# Patient Record
Sex: FEMALE | Race: OTHER | HISPANIC OR LATINO | URBAN - METROPOLITAN AREA
[De-identification: names, ages, dates, MRNs, and addresses within clinical notes are randomized per-mention and may not be internally consistent; named-entity substitution may affect disease eponyms.]

---

## 2021-02-12 ENCOUNTER — INPATIENT (INPATIENT)
Facility: HOSPITAL | Age: 31
LOS: 4 days | Discharge: HOME | End: 2021-02-17
Attending: OBSTETRICS & GYNECOLOGY | Admitting: OBSTETRICS & GYNECOLOGY

## 2021-02-12 VITALS
SYSTOLIC BLOOD PRESSURE: 143 MMHG | RESPIRATION RATE: 16 BRPM | DIASTOLIC BLOOD PRESSURE: 87 MMHG | TEMPERATURE: 99 F | HEART RATE: 45 BPM

## 2021-02-12 DIAGNOSIS — Z3A.00 WEEKS OF GESTATION OF PREGNANCY NOT SPECIFIED: ICD-10-CM

## 2021-02-12 DIAGNOSIS — O26.899 OTHER SPECIFIED PREGNANCY RELATED CONDITIONS, UNSPECIFIED TRIMESTER: ICD-10-CM

## 2021-02-12 DIAGNOSIS — Z98.890 OTHER SPECIFIED POSTPROCEDURAL STATES: Chronic | ICD-10-CM

## 2021-02-12 LAB
ALBUMIN SERPL ELPH-MCNC: 3.3 G/DL — SIGNIFICANT CHANGE UP (ref 3.3–5)
ALBUMIN SERPL ELPH-MCNC: 3.4 G/DL — SIGNIFICANT CHANGE UP (ref 3.3–5)
ALP SERPL-CCNC: 91 U/L — SIGNIFICANT CHANGE UP (ref 40–120)
ALP SERPL-CCNC: 92 U/L — SIGNIFICANT CHANGE UP (ref 40–120)
ALT FLD-CCNC: 11 U/L — SIGNIFICANT CHANGE UP (ref 4–33)
ALT FLD-CCNC: 13 U/L — SIGNIFICANT CHANGE UP (ref 4–33)
ANION GAP SERPL CALC-SCNC: 12 MMOL/L — SIGNIFICANT CHANGE UP (ref 7–14)
ANION GAP SERPL CALC-SCNC: 9 MMOL/L — SIGNIFICANT CHANGE UP (ref 7–14)
APPEARANCE UR: CLEAR — SIGNIFICANT CHANGE UP
APTT BLD: 26.8 SEC — LOW (ref 27–36.3)
APTT BLD: 31.7 SEC — SIGNIFICANT CHANGE UP (ref 27–36.3)
AST SERPL-CCNC: 18 U/L — SIGNIFICANT CHANGE UP (ref 4–32)
AST SERPL-CCNC: 19 U/L — SIGNIFICANT CHANGE UP (ref 4–32)
BACTERIA # UR AUTO: SIGNIFICANT CHANGE UP
BASOPHILS # BLD AUTO: 0.02 K/UL — SIGNIFICANT CHANGE UP (ref 0–0.2)
BASOPHILS # BLD AUTO: 0.02 K/UL — SIGNIFICANT CHANGE UP (ref 0–0.2)
BASOPHILS NFR BLD AUTO: 0.2 % — SIGNIFICANT CHANGE UP (ref 0–2)
BASOPHILS NFR BLD AUTO: 0.2 % — SIGNIFICANT CHANGE UP (ref 0–2)
BILIRUB SERPL-MCNC: 0.2 MG/DL — SIGNIFICANT CHANGE UP (ref 0.2–1.2)
BILIRUB SERPL-MCNC: <0.2 MG/DL — SIGNIFICANT CHANGE UP (ref 0.2–1.2)
BILIRUB UR-MCNC: NEGATIVE — SIGNIFICANT CHANGE UP
BLD GP AB SCN SERPL QL: NEGATIVE — SIGNIFICANT CHANGE UP
BUN SERPL-MCNC: 7 MG/DL — SIGNIFICANT CHANGE UP (ref 7–23)
BUN SERPL-MCNC: 8 MG/DL — SIGNIFICANT CHANGE UP (ref 7–23)
CALCIUM SERPL-MCNC: 8.4 MG/DL — SIGNIFICANT CHANGE UP (ref 8.4–10.5)
CALCIUM SERPL-MCNC: 8.9 MG/DL — SIGNIFICANT CHANGE UP (ref 8.4–10.5)
CHLORIDE SERPL-SCNC: 102 MMOL/L — SIGNIFICANT CHANGE UP (ref 98–107)
CHLORIDE SERPL-SCNC: 104 MMOL/L — SIGNIFICANT CHANGE UP (ref 98–107)
CO2 SERPL-SCNC: 23 MMOL/L — SIGNIFICANT CHANGE UP (ref 22–31)
CO2 SERPL-SCNC: 25 MMOL/L — SIGNIFICANT CHANGE UP (ref 22–31)
COLOR SPEC: YELLOW — SIGNIFICANT CHANGE UP
CREAT ?TM UR-MCNC: 95 MG/DL — SIGNIFICANT CHANGE UP
CREAT SERPL-MCNC: 0.54 MG/DL — SIGNIFICANT CHANGE UP (ref 0.5–1.3)
CREAT SERPL-MCNC: 0.57 MG/DL — SIGNIFICANT CHANGE UP (ref 0.5–1.3)
DIFF PNL FLD: NEGATIVE — SIGNIFICANT CHANGE UP
EOSINOPHIL # BLD AUTO: 0.1 K/UL — SIGNIFICANT CHANGE UP (ref 0–0.5)
EOSINOPHIL # BLD AUTO: 0.1 K/UL — SIGNIFICANT CHANGE UP (ref 0–0.5)
EOSINOPHIL NFR BLD AUTO: 1 % — SIGNIFICANT CHANGE UP (ref 0–6)
EOSINOPHIL NFR BLD AUTO: 1 % — SIGNIFICANT CHANGE UP (ref 0–6)
EPI CELLS # UR: 1 /HPF — SIGNIFICANT CHANGE UP (ref 0–5)
FIBRINOGEN PPP-MCNC: 516 MG/DL — SIGNIFICANT CHANGE UP (ref 290–520)
FIBRINOGEN PPP-MCNC: 614 MG/DL — HIGH (ref 290–520)
GLUCOSE SERPL-MCNC: 109 MG/DL — HIGH (ref 70–99)
GLUCOSE SERPL-MCNC: 76 MG/DL — SIGNIFICANT CHANGE UP (ref 70–99)
GLUCOSE UR QL: NEGATIVE — SIGNIFICANT CHANGE UP
HCT VFR BLD CALC: 34.2 % — LOW (ref 34.5–45)
HCT VFR BLD CALC: 34.3 % — LOW (ref 34.5–45)
HGB BLD-MCNC: 11.1 G/DL — LOW (ref 11.5–15.5)
HGB BLD-MCNC: 11.1 G/DL — LOW (ref 11.5–15.5)
HIV 1+2 AB+HIV1 P24 AG SERPL QL IA: SIGNIFICANT CHANGE UP
HYALINE CASTS # UR AUTO: 0 /LPF — SIGNIFICANT CHANGE UP (ref 0–7)
IANC: 7.23 K/UL — SIGNIFICANT CHANGE UP (ref 1.5–8.5)
IANC: 7.48 K/UL — SIGNIFICANT CHANGE UP (ref 1.5–8.5)
IMM GRANULOCYTES NFR BLD AUTO: 0.4 % — SIGNIFICANT CHANGE UP (ref 0–1.5)
IMM GRANULOCYTES NFR BLD AUTO: 0.7 % — SIGNIFICANT CHANGE UP (ref 0–1.5)
INR BLD: 0.98 RATIO — SIGNIFICANT CHANGE UP (ref 0.88–1.16)
INR BLD: 1.06 RATIO — SIGNIFICANT CHANGE UP (ref 0.88–1.16)
KETONES UR-MCNC: NEGATIVE — SIGNIFICANT CHANGE UP
LDH SERPL L TO P-CCNC: 185 U/L — SIGNIFICANT CHANGE UP (ref 135–225)
LDH SERPL L TO P-CCNC: 188 U/L — SIGNIFICANT CHANGE UP (ref 135–225)
LEUKOCYTE ESTERASE UR-ACNC: NEGATIVE — SIGNIFICANT CHANGE UP
LYMPHOCYTES # BLD AUTO: 1.41 K/UL — SIGNIFICANT CHANGE UP (ref 1–3.3)
LYMPHOCYTES # BLD AUTO: 1.83 K/UL — SIGNIFICANT CHANGE UP (ref 1–3.3)
LYMPHOCYTES # BLD AUTO: 14.6 % — SIGNIFICANT CHANGE UP (ref 13–44)
LYMPHOCYTES # BLD AUTO: 18.4 % — SIGNIFICANT CHANGE UP (ref 13–44)
MAGNESIUM SERPL-MCNC: 3.8 MG/DL — HIGH (ref 1.6–2.6)
MCHC RBC-ENTMCNC: 28.7 PG — SIGNIFICANT CHANGE UP (ref 27–34)
MCHC RBC-ENTMCNC: 29.1 PG — SIGNIFICANT CHANGE UP (ref 27–34)
MCHC RBC-ENTMCNC: 32.4 GM/DL — SIGNIFICANT CHANGE UP (ref 32–36)
MCHC RBC-ENTMCNC: 32.5 GM/DL — SIGNIFICANT CHANGE UP (ref 32–36)
MCV RBC AUTO: 88.6 FL — SIGNIFICANT CHANGE UP (ref 80–100)
MCV RBC AUTO: 89.5 FL — SIGNIFICANT CHANGE UP (ref 80–100)
MONOCYTES # BLD AUTO: 0.6 K/UL — SIGNIFICANT CHANGE UP (ref 0–0.9)
MONOCYTES # BLD AUTO: 0.73 K/UL — SIGNIFICANT CHANGE UP (ref 0–0.9)
MONOCYTES NFR BLD AUTO: 6.2 % — SIGNIFICANT CHANGE UP (ref 2–14)
MONOCYTES NFR BLD AUTO: 7.3 % — SIGNIFICANT CHANGE UP (ref 2–14)
NEUTROPHILS # BLD AUTO: 7.23 K/UL — SIGNIFICANT CHANGE UP (ref 1.8–7.4)
NEUTROPHILS # BLD AUTO: 7.48 K/UL — HIGH (ref 1.8–7.4)
NEUTROPHILS NFR BLD AUTO: 72.7 % — SIGNIFICANT CHANGE UP (ref 43–77)
NEUTROPHILS NFR BLD AUTO: 77.3 % — HIGH (ref 43–77)
NITRITE UR-MCNC: NEGATIVE — SIGNIFICANT CHANGE UP
NRBC # BLD: 0 /100 WBCS — SIGNIFICANT CHANGE UP
NRBC # BLD: 0 /100 WBCS — SIGNIFICANT CHANGE UP
NRBC # FLD: 0 K/UL — SIGNIFICANT CHANGE UP
NRBC # FLD: 0 K/UL — SIGNIFICANT CHANGE UP
PH UR: 7.5 — SIGNIFICANT CHANGE UP (ref 5–8)
PLATELET # BLD AUTO: 175 K/UL — SIGNIFICANT CHANGE UP (ref 150–400)
PLATELET # BLD AUTO: 176 K/UL — SIGNIFICANT CHANGE UP (ref 150–400)
POTASSIUM SERPL-MCNC: 3 MMOL/L — LOW (ref 3.5–5.3)
POTASSIUM SERPL-MCNC: 3.4 MMOL/L — LOW (ref 3.5–5.3)
POTASSIUM SERPL-SCNC: 3 MMOL/L — LOW (ref 3.5–5.3)
POTASSIUM SERPL-SCNC: 3.4 MMOL/L — LOW (ref 3.5–5.3)
PROT ?TM UR-MCNC: 16 MG/DL — SIGNIFICANT CHANGE UP
PROT ?TM UR-MCNC: 16 MG/DL — SIGNIFICANT CHANGE UP
PROT SERPL-MCNC: 6.2 G/DL — SIGNIFICANT CHANGE UP (ref 6–8.3)
PROT SERPL-MCNC: 6.3 G/DL — SIGNIFICANT CHANGE UP (ref 6–8.3)
PROT UR-MCNC: ABNORMAL
PROT/CREAT UR-RTO: 0.2 RATIO — SIGNIFICANT CHANGE UP (ref 0–0.2)
PROTHROM AB SERPL-ACNC: 11.3 SEC — SIGNIFICANT CHANGE UP (ref 10.6–13.6)
PROTHROM AB SERPL-ACNC: 12 SEC — SIGNIFICANT CHANGE UP (ref 10.6–13.6)
RBC # BLD: 3.82 M/UL — SIGNIFICANT CHANGE UP (ref 3.8–5.2)
RBC # BLD: 3.87 M/UL — SIGNIFICANT CHANGE UP (ref 3.8–5.2)
RBC # FLD: 13.6 % — SIGNIFICANT CHANGE UP (ref 10.3–14.5)
RBC # FLD: 13.8 % — SIGNIFICANT CHANGE UP (ref 10.3–14.5)
RBC CASTS # UR COMP ASSIST: 1 /HPF — SIGNIFICANT CHANGE UP (ref 0–4)
RH IG SCN BLD-IMP: POSITIVE — SIGNIFICANT CHANGE UP
RH IG SCN BLD-IMP: POSITIVE — SIGNIFICANT CHANGE UP
SARS-COV-2 RNA SPEC QL NAA+PROBE: SIGNIFICANT CHANGE UP
SODIUM SERPL-SCNC: 137 MMOL/L — SIGNIFICANT CHANGE UP (ref 135–145)
SODIUM SERPL-SCNC: 138 MMOL/L — SIGNIFICANT CHANGE UP (ref 135–145)
SP GR SPEC: 1.02 — SIGNIFICANT CHANGE UP (ref 1.01–1.02)
URATE SERPL-MCNC: 4.8 MG/DL — SIGNIFICANT CHANGE UP (ref 2.5–7)
URATE SERPL-MCNC: 5.1 MG/DL — SIGNIFICANT CHANGE UP (ref 2.5–7)
UROBILINOGEN FLD QL: SIGNIFICANT CHANGE UP
WBC # BLD: 9.68 K/UL — SIGNIFICANT CHANGE UP (ref 3.8–10.5)
WBC # BLD: 9.95 K/UL — SIGNIFICANT CHANGE UP (ref 3.8–10.5)
WBC # FLD AUTO: 9.68 K/UL — SIGNIFICANT CHANGE UP (ref 3.8–10.5)
WBC # FLD AUTO: 9.95 K/UL — SIGNIFICANT CHANGE UP (ref 3.8–10.5)
WBC UR QL: 2 /HPF — SIGNIFICANT CHANGE UP (ref 0–5)

## 2021-02-12 RX ORDER — MAGNESIUM SULFATE 500 MG/ML
4 VIAL (ML) INJECTION ONCE
Refills: 0 | Status: COMPLETED | OUTPATIENT
Start: 2021-02-12 | End: 2021-02-12

## 2021-02-12 RX ORDER — OXYTOCIN 10 UNIT/ML
333.33 VIAL (ML) INJECTION
Qty: 20 | Refills: 0 | Status: DISCONTINUED | OUTPATIENT
Start: 2021-02-12 | End: 2021-02-13

## 2021-02-12 RX ORDER — MAGNESIUM SULFATE 500 MG/ML
2 VIAL (ML) INJECTION
Qty: 40 | Refills: 0 | Status: DISCONTINUED | OUTPATIENT
Start: 2021-02-12 | End: 2021-02-12

## 2021-02-12 RX ORDER — SODIUM CHLORIDE 9 MG/ML
1000 INJECTION, SOLUTION INTRAVENOUS
Refills: 0 | Status: DISCONTINUED | OUTPATIENT
Start: 2021-02-12 | End: 2021-02-13

## 2021-02-12 RX ORDER — INFLUENZA VIRUS VACCINE 15; 15; 15; 15 UG/.5ML; UG/.5ML; UG/.5ML; UG/.5ML
0.5 SUSPENSION INTRAMUSCULAR ONCE
Refills: 0 | Status: COMPLETED | OUTPATIENT
Start: 2021-02-12 | End: 2021-02-12

## 2021-02-12 RX ORDER — MAGNESIUM SULFATE 500 MG/ML
2 VIAL (ML) INJECTION
Qty: 40 | Refills: 0 | Status: DISCONTINUED | OUTPATIENT
Start: 2021-02-12 | End: 2021-02-13

## 2021-02-12 RX ORDER — SODIUM CHLORIDE 9 MG/ML
1000 INJECTION, SOLUTION INTRAVENOUS
Refills: 0 | Status: DISCONTINUED | OUTPATIENT
Start: 2021-02-12 | End: 2021-02-12

## 2021-02-12 RX ORDER — OXYTOCIN 10 UNIT/ML
333.33 VIAL (ML) INJECTION
Qty: 20 | Refills: 0 | Status: DISCONTINUED | OUTPATIENT
Start: 2021-02-12 | End: 2021-02-12

## 2021-02-12 RX ADMIN — Medication 50 GM/HR: at 17:35

## 2021-02-12 RX ADMIN — Medication 200 GRAM(S): at 17:14

## 2021-02-12 NOTE — OB PROVIDER TRIAGE NOTE - NSHPPHYSICALEXAM_GEN_ALL_CORE
Vital Signs Last 24 Hrs  T(C): 37.1 (12 Feb 2021 15:05), Max: 37.1 (12 Feb 2021 14:52)  T(F): 98.78 (12 Feb 2021 15:05), Max: 98.8 (12 Feb 2021 14:52)  HR: 85 (12 Feb 2021 15:21) (45 - 90)  BP: 143/87 (12 Feb 2021 15:05) (143/87 - 143/87)  RR: 16 (12 Feb 2021 14:52) (16 - 16)  SpO2: 98% (12 Feb 2021 15:21) (98% - 99%)  TAS:  FHR:  CTX: Vital Signs Last 24 Hrs  T(C): 37.1 (12 Feb 2021 15:05), Max: 37.1 (12 Feb 2021 14:52)  T(F): 98.78 (12 Feb 2021 15:05), Max: 98.8 (12 Feb 2021 14:52)  HR: 85 (12 Feb 2021 15:21) (45 - 90)  BP: 143/87 (12 Feb 2021 15:05) (143/87 - 143/87)  RR: 16 (12 Feb 2021 14:52) (16 - 16)  SpO2: 98% (12 Feb 2021 15:21) (98% - 99%)  TAS:  FHR: 140 baseline with accelerations, moderate variability   CTX: no contractions Vital Signs Last 24 Hrs  T(C): 37.1 (12 Feb 2021 15:05), Max: 37.1 (12 Feb 2021 14:52)  T(F): 98.78 (12 Feb 2021 15:05), Max: 98.8 (12 Feb 2021 14:52)  HR: 85 (12 Feb 2021 15:21) (45 - 90)  BP: 143/87 (12 Feb 2021 15:05) (143/87 - 143/87)  RR: 16 (12 Feb 2021 14:52) (16 - 16)  SpO2: 98% (12 Feb 2021 15:21) (98% - 99%)  FHR: 135 baseline with accelerations, moderate variability   CTX: no contractions Vital Signs Last 24 Hrs  T(C): 37.1 (12 Feb 2021 15:05), Max: 37.1 (12 Feb 2021 14:52)  T(F): 98.78 (12 Feb 2021 15:05), Max: 98.8 (12 Feb 2021 14:52)  HR: 85 (12 Feb 2021 15:21) (45 - 90)  BP: 143/87 (12 Feb 2021 15:05) (143/87 - 143/87)  RR: 16 (12 Feb 2021 14:52) (16 - 16)  SpO2: 98% (12 Feb 2021 15:21) (98% - 99%)  FHR: 135 baseline with accelerations, moderate variability   CTX: no contractions  TAS: cephalic presentation  SVE: 0/40/-3  EFW 3062 from sono in office today

## 2021-02-12 NOTE — OB PROVIDER H&P - NSHPPHYSICALEXAM_GEN_ALL_CORE
Vital Signs Last 24 Hrs  T(C): 37.1 (12 Feb 2021 15:05), Max: 37.1 (12 Feb 2021 14:52)  T(F): 98.78 (12 Feb 2021 15:05), Max: 98.8 (12 Feb 2021 14:52)  HR: 85 (12 Feb 2021 15:21) (45 - 90)  BP: 143/87 (12 Feb 2021 15:05) (143/87 - 143/87)  RR: 16 (12 Feb 2021 14:52) (16 - 16)  SpO2: 98% (12 Feb 2021 15:21) (98% - 99%)  FHR: 135 baseline with accelerations, moderate variability   CTX: no contractions  TAS: cephalic presentation  SVE: 0/40/-3  EFW 3062 from sono in office today

## 2021-02-12 NOTE — OB PROVIDER TRIAGE NOTE - NSHPLABSRESULTS_GEN_ALL_CORE
11.1   9.68  )-----------( 175      ( 12 Feb 2021 14:30 )             34.3     02-12    138  |  104  |  8   ----------------------------<  109<H>  3.4<L>   |  25  |  0.57    Ca    8.9      12 Feb 2021 14:30    TPro  6.3  /  Alb  3.3  /  TBili  <0.2  /  DBili  x   /  AST  19  /  ALT  13  /  AlkPhos  91  02-12    PCR .2    Fibrinogen 614

## 2021-02-12 NOTE — OB PROVIDER TRIAGE NOTE - NSOBPROVIDERNOTE_OBGYN_ALL_OB_FT
Quick Look evaluation:    1400: /101 HR 88  1415: /100 HR 94  HELLP labs sent Quick Look evaluation:    1400: /101 HR 88  1415: /100 HR 94  HELLP labs sent    Admit to labor and delivery for induction of labor, for hypertensive crisis  - reviewed patient's history, assessment, blood pressure trend with   - for oral cytotec  - for Magnesium Sulfate  - COVID testing completed for patient and significant other  - admission consents obtained

## 2021-02-12 NOTE — OB PROVIDER H&P - ASSESSMENT
Admit to labor and delivery for induction of labor, for hypertensive crisis  - reviewed patient's history, assessment, blood pressure trend with   - for oral cytotec  - for Magnesium Sulfate  - COVID testing completed for patient and significant other  - admission consents obtained

## 2021-02-12 NOTE — OB PROVIDER TRIAGE NOTE - HISTORY OF PRESENT ILLNESS
's patient is a 31 y/o EDC 3/8/21 EGA 36   sent from office 146/98, 145/110, 146/98. Patient reports of a headache since yesterday, 3 out 10 on numeric pain scale. Denies visional changes. Patient reports of pain under the right breast, mostly at night.     COVID: positive   AP complications: Denies  Medical History: Denies  Surgical History: Hernia/Inguinal as a child  OBGYN History: Denies

## 2021-02-12 NOTE — OB PROVIDER H&P - PROBLEM SELECTOR PLAN 1
none
Admit to labor and delivery for induction of labor, for hypertensive crisis  - reviewed patient's history, assessment, blood pressure trend with   - for oral cytotec  - for Magnesium Sulfate  - COVID testing completed for patient and significant other  - admission consents obtained

## 2021-02-12 NOTE — OB PROVIDER H&P - HISTORY OF PRESENT ILLNESS
's patient is a 29 y/o EDC 3/8/21 EGA 36   sent from office 146/98, 145/110, 146/98. Patient reports of a headache since yesterday, 3 out 10 on numeric pain scale. Denies visional changes. Patient reports of pain under the right breast, mostly at night.     COVID: positive   AP complications: Denies  Medical History: Denies  Surgical History: Hernia/Inguinal as a child  OBGYN History: Denies

## 2021-02-12 NOTE — OB RN PATIENT PROFILE - NSRUBEOLARESULTS_OBGYN_ALL_OB
unknown Z Plasty Text: The lesion was extirpated to the level of the fat with a #15 scalpel blade.  Given the location of the defect, shape of the defect and the proximity to free margins a Z-plasty was deemed most appropriate for repair.  Using a sterile surgical marker, the appropriate transposition arms of the Z-plasty were drawn incorporating the defect and placing the expected incisions within the relaxed skin tension lines where possible.    The area thus outlined was incised deep to adipose tissue with a #15 scalpel blade.  The skin margins were undermined to an appropriate distance in all directions utilizing iris scissors.  The opposing transposition arms were then transposed into place in opposite direction and anchored with interrupted buried subcutaneous sutures.

## 2021-02-13 ENCOUNTER — TRANSCRIPTION ENCOUNTER (OUTPATIENT)
Age: 31
End: 2021-02-13

## 2021-02-13 LAB
ALBUMIN SERPL ELPH-MCNC: 3.4 G/DL — SIGNIFICANT CHANGE UP (ref 3.3–5)
ALP SERPL-CCNC: 97 U/L — SIGNIFICANT CHANGE UP (ref 40–120)
ALT FLD-CCNC: 14 U/L — SIGNIFICANT CHANGE UP (ref 4–33)
ANION GAP SERPL CALC-SCNC: 11 MMOL/L — SIGNIFICANT CHANGE UP (ref 7–14)
APTT BLD: 26.3 SEC — LOW (ref 27–36.3)
AST SERPL-CCNC: 17 U/L — SIGNIFICANT CHANGE UP (ref 4–32)
BASOPHILS # BLD AUTO: 0.02 K/UL — SIGNIFICANT CHANGE UP (ref 0–0.2)
BASOPHILS NFR BLD AUTO: 0.2 % — SIGNIFICANT CHANGE UP (ref 0–2)
BILIRUB SERPL-MCNC: 0.3 MG/DL — SIGNIFICANT CHANGE UP (ref 0.2–1.2)
BUN SERPL-MCNC: 4 MG/DL — LOW (ref 7–23)
CALCIUM SERPL-MCNC: 7.3 MG/DL — LOW (ref 8.4–10.5)
CHLORIDE SERPL-SCNC: 99 MMOL/L — SIGNIFICANT CHANGE UP (ref 98–107)
CO2 SERPL-SCNC: 26 MMOL/L — SIGNIFICANT CHANGE UP (ref 22–31)
CREAT SERPL-MCNC: 0.6 MG/DL — SIGNIFICANT CHANGE UP (ref 0.5–1.3)
EOSINOPHIL # BLD AUTO: 0.16 K/UL — SIGNIFICANT CHANGE UP (ref 0–0.5)
EOSINOPHIL NFR BLD AUTO: 1.6 % — SIGNIFICANT CHANGE UP (ref 0–6)
FIBRINOGEN PPP-MCNC: 649 MG/DL — HIGH (ref 290–520)
GLUCOSE SERPL-MCNC: 95 MG/DL — SIGNIFICANT CHANGE UP (ref 70–99)
HCT VFR BLD CALC: 34.6 % — SIGNIFICANT CHANGE UP (ref 34.5–45)
HGB BLD-MCNC: 11.1 G/DL — LOW (ref 11.5–15.5)
IANC: 7.81 K/UL — SIGNIFICANT CHANGE UP (ref 1.5–8.5)
IMM GRANULOCYTES NFR BLD AUTO: 0.4 % — SIGNIFICANT CHANGE UP (ref 0–1.5)
INR BLD: 1.04 RATIO — SIGNIFICANT CHANGE UP (ref 0.88–1.16)
LDH SERPL L TO P-CCNC: 191 U/L — SIGNIFICANT CHANGE UP (ref 135–225)
LYMPHOCYTES # BLD AUTO: 1.4 K/UL — SIGNIFICANT CHANGE UP (ref 1–3.3)
LYMPHOCYTES # BLD AUTO: 13.8 % — SIGNIFICANT CHANGE UP (ref 13–44)
MAGNESIUM SERPL-MCNC: 4.5 MG/DL — HIGH (ref 1.6–2.6)
MAGNESIUM SERPL-MCNC: 4.8 MG/DL — HIGH (ref 1.6–2.6)
MAGNESIUM SERPL-MCNC: 4.9 MG/DL — HIGH (ref 1.6–2.6)
MAGNESIUM SERPL-MCNC: 4.9 MG/DL — HIGH (ref 1.6–2.6)
MCHC RBC-ENTMCNC: 28.9 PG — SIGNIFICANT CHANGE UP (ref 27–34)
MCHC RBC-ENTMCNC: 32.1 GM/DL — SIGNIFICANT CHANGE UP (ref 32–36)
MCV RBC AUTO: 90.1 FL — SIGNIFICANT CHANGE UP (ref 80–100)
MONOCYTES # BLD AUTO: 0.68 K/UL — SIGNIFICANT CHANGE UP (ref 0–0.9)
MONOCYTES NFR BLD AUTO: 6.7 % — SIGNIFICANT CHANGE UP (ref 2–14)
NEUTROPHILS # BLD AUTO: 7.81 K/UL — HIGH (ref 1.8–7.4)
NEUTROPHILS NFR BLD AUTO: 77.3 % — HIGH (ref 43–77)
NRBC # BLD: 0 /100 WBCS — SIGNIFICANT CHANGE UP
NRBC # FLD: 0 K/UL — SIGNIFICANT CHANGE UP
PLATELET # BLD AUTO: 166 K/UL — SIGNIFICANT CHANGE UP (ref 150–400)
POTASSIUM SERPL-MCNC: 3 MMOL/L — LOW (ref 3.5–5.3)
POTASSIUM SERPL-SCNC: 3 MMOL/L — LOW (ref 3.5–5.3)
PROT SERPL-MCNC: 6.2 G/DL — SIGNIFICANT CHANGE UP (ref 6–8.3)
PROTHROM AB SERPL-ACNC: 11.8 SEC — SIGNIFICANT CHANGE UP (ref 10.6–13.6)
RBC # BLD: 3.84 M/UL — SIGNIFICANT CHANGE UP (ref 3.8–5.2)
RBC # FLD: 13.7 % — SIGNIFICANT CHANGE UP (ref 10.3–14.5)
SARS-COV-2 IGG SERPL QL IA: POSITIVE
SARS-COV-2 IGM SERPL IA-ACNC: 6 INDEX — HIGH
SODIUM SERPL-SCNC: 136 MMOL/L — SIGNIFICANT CHANGE UP (ref 135–145)
T PALLIDUM AB TITR SER: NEGATIVE — SIGNIFICANT CHANGE UP
URATE SERPL-MCNC: 5.7 MG/DL — SIGNIFICANT CHANGE UP (ref 2.5–7)
WBC # BLD: 10.11 K/UL — SIGNIFICANT CHANGE UP (ref 3.8–10.5)
WBC # FLD AUTO: 10.11 K/UL — SIGNIFICANT CHANGE UP (ref 3.8–10.5)

## 2021-02-13 RX ORDER — SODIUM CHLORIDE 9 MG/ML
1000 INJECTION, SOLUTION INTRAVENOUS
Refills: 0 | Status: DISCONTINUED | OUTPATIENT
Start: 2021-02-13 | End: 2021-02-13

## 2021-02-13 RX ORDER — OXYTOCIN 10 UNIT/ML
333.33 VIAL (ML) INJECTION
Qty: 20 | Refills: 0 | Status: DISCONTINUED | OUTPATIENT
Start: 2021-02-13 | End: 2021-02-16

## 2021-02-13 RX ORDER — SODIUM CHLORIDE 9 MG/ML
1000 INJECTION, SOLUTION INTRAVENOUS
Refills: 0 | Status: DISCONTINUED | OUTPATIENT
Start: 2021-02-13 | End: 2021-02-14

## 2021-02-13 RX ORDER — OXYTOCIN 10 UNIT/ML
2 VIAL (ML) INJECTION
Qty: 30 | Refills: 0 | Status: DISCONTINUED | OUTPATIENT
Start: 2021-02-13 | End: 2021-02-14

## 2021-02-13 RX ORDER — MAGNESIUM SULFATE 500 MG/ML
2 VIAL (ML) INJECTION
Qty: 40 | Refills: 0 | Status: COMPLETED | OUTPATIENT
Start: 2021-02-13 | End: 2021-02-14

## 2021-02-13 RX ORDER — DIPHENHYDRAMINE HCL 50 MG
50 CAPSULE ORAL EVERY 6 HOURS
Refills: 0 | Status: DISCONTINUED | OUTPATIENT
Start: 2021-02-13 | End: 2021-02-14

## 2021-02-13 RX ADMIN — Medication 50 MILLIGRAM(S): at 05:31

## 2021-02-13 RX ADMIN — Medication 50 GM/HR: at 07:11

## 2021-02-13 RX ADMIN — Medication 2 MILLIUNIT(S)/MIN: at 21:37

## 2021-02-13 NOTE — CHART NOTE - NSCHARTNOTEFT_GEN_A_CORE
R3  Patient re-examined by Dr. Garcia PGY1 for cervical change and removal of CB.  CB deflated, VE 4/60/-3.  EFM: 120, mod, +accels, -decels  Cesar Chavez: irregular, q2-4min  Will proceed with froilna Hernadez PGY3  d/w Dr. Vazquez

## 2021-02-14 LAB
ALBUMIN SERPL ELPH-MCNC: 3.4 G/DL — SIGNIFICANT CHANGE UP (ref 3.3–5)
ALP SERPL-CCNC: 102 U/L — SIGNIFICANT CHANGE UP (ref 40–120)
ALT FLD-CCNC: 11 U/L — SIGNIFICANT CHANGE UP (ref 4–33)
ANION GAP SERPL CALC-SCNC: 10 MMOL/L — SIGNIFICANT CHANGE UP (ref 7–14)
APTT BLD: 26.5 SEC — LOW (ref 27–36.3)
AST SERPL-CCNC: 14 U/L — SIGNIFICANT CHANGE UP (ref 4–32)
BASOPHILS # BLD AUTO: 0.02 K/UL — SIGNIFICANT CHANGE UP (ref 0–0.2)
BASOPHILS NFR BLD AUTO: 0.2 % — SIGNIFICANT CHANGE UP (ref 0–2)
BILIRUB SERPL-MCNC: 0.4 MG/DL — SIGNIFICANT CHANGE UP (ref 0.2–1.2)
BUN SERPL-MCNC: 3 MG/DL — LOW (ref 7–23)
CALCIUM SERPL-MCNC: 7.4 MG/DL — LOW (ref 8.4–10.5)
CHLORIDE SERPL-SCNC: 101 MMOL/L — SIGNIFICANT CHANGE UP (ref 98–107)
CO2 SERPL-SCNC: 25 MMOL/L — SIGNIFICANT CHANGE UP (ref 22–31)
CREAT SERPL-MCNC: 0.57 MG/DL — SIGNIFICANT CHANGE UP (ref 0.5–1.3)
EOSINOPHIL # BLD AUTO: 0.16 K/UL — SIGNIFICANT CHANGE UP (ref 0–0.5)
EOSINOPHIL NFR BLD AUTO: 1.4 % — SIGNIFICANT CHANGE UP (ref 0–6)
FIBRINOGEN PPP-MCNC: 690 MG/DL — HIGH (ref 290–520)
GLUCOSE SERPL-MCNC: 87 MG/DL — SIGNIFICANT CHANGE UP (ref 70–99)
HCT VFR BLD CALC: 34.5 % — SIGNIFICANT CHANGE UP (ref 34.5–45)
HGB BLD-MCNC: 11.4 G/DL — LOW (ref 11.5–15.5)
IANC: 9.18 K/UL — HIGH (ref 1.5–8.5)
IMM GRANULOCYTES NFR BLD AUTO: 0.4 % — SIGNIFICANT CHANGE UP (ref 0–1.5)
INR BLD: 1.02 RATIO — SIGNIFICANT CHANGE UP (ref 0.88–1.16)
LDH SERPL L TO P-CCNC: 199 U/L — SIGNIFICANT CHANGE UP (ref 135–225)
LYMPHOCYTES # BLD AUTO: 1.12 K/UL — SIGNIFICANT CHANGE UP (ref 1–3.3)
LYMPHOCYTES # BLD AUTO: 9.8 % — LOW (ref 13–44)
MAGNESIUM SERPL-MCNC: 4.1 MG/DL — HIGH (ref 1.6–2.6)
MAGNESIUM SERPL-MCNC: 5.1 MG/DL — HIGH (ref 1.6–2.6)
MAGNESIUM SERPL-MCNC: 5.6 MG/DL — HIGH (ref 1.6–2.6)
MCHC RBC-ENTMCNC: 28.8 PG — SIGNIFICANT CHANGE UP (ref 27–34)
MCHC RBC-ENTMCNC: 33 GM/DL — SIGNIFICANT CHANGE UP (ref 32–36)
MCV RBC AUTO: 87.1 FL — SIGNIFICANT CHANGE UP (ref 80–100)
MONOCYTES # BLD AUTO: 0.93 K/UL — HIGH (ref 0–0.9)
MONOCYTES NFR BLD AUTO: 8.1 % — SIGNIFICANT CHANGE UP (ref 2–14)
NEUTROPHILS # BLD AUTO: 9.18 K/UL — HIGH (ref 1.8–7.4)
NEUTROPHILS NFR BLD AUTO: 80.1 % — HIGH (ref 43–77)
NRBC # BLD: 0 /100 WBCS — SIGNIFICANT CHANGE UP
NRBC # FLD: 0 K/UL — SIGNIFICANT CHANGE UP
PLATELET # BLD AUTO: 179 K/UL — SIGNIFICANT CHANGE UP (ref 150–400)
POTASSIUM SERPL-MCNC: 3.1 MMOL/L — LOW (ref 3.5–5.3)
POTASSIUM SERPL-SCNC: 3.1 MMOL/L — LOW (ref 3.5–5.3)
PROT SERPL-MCNC: 6.4 G/DL — SIGNIFICANT CHANGE UP (ref 6–8.3)
PROTHROM AB SERPL-ACNC: 11.7 SEC — SIGNIFICANT CHANGE UP (ref 10.6–13.6)
RBC # BLD: 3.96 M/UL — SIGNIFICANT CHANGE UP (ref 3.8–5.2)
RBC # FLD: 13.8 % — SIGNIFICANT CHANGE UP (ref 10.3–14.5)
SODIUM SERPL-SCNC: 136 MMOL/L — SIGNIFICANT CHANGE UP (ref 135–145)
URATE SERPL-MCNC: 6.1 MG/DL — SIGNIFICANT CHANGE UP (ref 2.5–7)
WBC # BLD: 11.46 K/UL — HIGH (ref 3.8–10.5)
WBC # FLD AUTO: 11.46 K/UL — HIGH (ref 3.8–10.5)

## 2021-02-14 RX ORDER — MAGNESIUM HYDROXIDE 400 MG/1
30 TABLET, CHEWABLE ORAL
Refills: 0 | Status: DISCONTINUED | OUTPATIENT
Start: 2021-02-14 | End: 2021-02-17

## 2021-02-14 RX ORDER — TETANUS TOXOID, REDUCED DIPHTHERIA TOXOID AND ACELLULAR PERTUSSIS VACCINE, ADSORBED 5; 2.5; 8; 8; 2.5 [IU]/.5ML; [IU]/.5ML; UG/.5ML; UG/.5ML; UG/.5ML
0.5 SUSPENSION INTRAMUSCULAR ONCE
Refills: 0 | Status: DISCONTINUED | OUTPATIENT
Start: 2021-02-14 | End: 2021-02-17

## 2021-02-14 RX ORDER — SODIUM CHLORIDE 9 MG/ML
1000 INJECTION INTRAMUSCULAR; INTRAVENOUS; SUBCUTANEOUS
Refills: 0 | Status: DISCONTINUED | OUTPATIENT
Start: 2021-02-14 | End: 2021-02-14

## 2021-02-14 RX ORDER — OXYCODONE HYDROCHLORIDE 5 MG/1
5 TABLET ORAL ONCE
Refills: 0 | Status: DISCONTINUED | OUTPATIENT
Start: 2021-02-14 | End: 2021-02-17

## 2021-02-14 RX ORDER — NALBUPHINE HYDROCHLORIDE 10 MG/ML
2.5 INJECTION, SOLUTION INTRAMUSCULAR; INTRAVENOUS; SUBCUTANEOUS EVERY 6 HOURS
Refills: 0 | Status: DISCONTINUED | OUTPATIENT
Start: 2021-02-14 | End: 2021-02-17

## 2021-02-14 RX ORDER — LANOLIN
1 OINTMENT (GRAM) TOPICAL EVERY 6 HOURS
Refills: 0 | Status: DISCONTINUED | OUTPATIENT
Start: 2021-02-14 | End: 2021-02-17

## 2021-02-14 RX ORDER — OXYCODONE HYDROCHLORIDE 5 MG/1
5 TABLET ORAL
Refills: 0 | Status: DISCONTINUED | OUTPATIENT
Start: 2021-02-14 | End: 2021-02-14

## 2021-02-14 RX ORDER — SIMETHICONE 80 MG/1
80 TABLET, CHEWABLE ORAL EVERY 4 HOURS
Refills: 0 | Status: DISCONTINUED | OUTPATIENT
Start: 2021-02-14 | End: 2021-02-17

## 2021-02-14 RX ORDER — ACETAMINOPHEN 500 MG
1000 TABLET ORAL EVERY 6 HOURS
Refills: 0 | Status: COMPLETED | OUTPATIENT
Start: 2021-02-14 | End: 2021-02-15

## 2021-02-14 RX ORDER — MAGNESIUM SULFATE 500 MG/ML
2 VIAL (ML) INJECTION
Qty: 40 | Refills: 0 | Status: DISCONTINUED | OUTPATIENT
Start: 2021-02-14 | End: 2021-02-15

## 2021-02-14 RX ORDER — ONDANSETRON 8 MG/1
4 TABLET, FILM COATED ORAL EVERY 6 HOURS
Refills: 0 | Status: DISCONTINUED | OUTPATIENT
Start: 2021-02-14 | End: 2021-02-17

## 2021-02-14 RX ORDER — NALOXONE HYDROCHLORIDE 4 MG/.1ML
0.1 SPRAY NASAL
Refills: 0 | Status: DISCONTINUED | OUTPATIENT
Start: 2021-02-14 | End: 2021-02-17

## 2021-02-14 RX ORDER — MORPHINE SULFATE 50 MG/1
0.1 CAPSULE, EXTENDED RELEASE ORAL ONCE
Refills: 0 | Status: DISCONTINUED | OUTPATIENT
Start: 2021-02-14 | End: 2021-02-17

## 2021-02-14 RX ORDER — METOCLOPRAMIDE HCL 10 MG
10 TABLET ORAL ONCE
Refills: 0 | Status: DISCONTINUED | OUTPATIENT
Start: 2021-02-14 | End: 2021-02-17

## 2021-02-14 RX ORDER — DIPHENHYDRAMINE HCL 50 MG
25 CAPSULE ORAL EVERY 6 HOURS
Refills: 0 | Status: DISCONTINUED | OUTPATIENT
Start: 2021-02-14 | End: 2021-02-17

## 2021-02-14 RX ORDER — FAMOTIDINE 10 MG/ML
20 INJECTION INTRAVENOUS ONCE
Refills: 0 | Status: COMPLETED | OUTPATIENT
Start: 2021-02-14 | End: 2021-02-14

## 2021-02-14 RX ORDER — OXYCODONE HYDROCHLORIDE 5 MG/1
5 TABLET ORAL
Refills: 0 | Status: COMPLETED | OUTPATIENT
Start: 2021-02-14 | End: 2021-02-21

## 2021-02-14 RX ORDER — HEPARIN SODIUM 5000 [USP'U]/ML
5000 INJECTION INTRAVENOUS; SUBCUTANEOUS EVERY 12 HOURS
Refills: 0 | Status: DISCONTINUED | OUTPATIENT
Start: 2021-02-14 | End: 2021-02-17

## 2021-02-14 RX ORDER — CITRIC ACID/SODIUM CITRATE 300-500 MG
30 SOLUTION, ORAL ORAL ONCE
Refills: 0 | Status: COMPLETED | OUTPATIENT
Start: 2021-02-14 | End: 2021-02-14

## 2021-02-14 RX ORDER — SODIUM CHLORIDE 9 MG/ML
1000 INJECTION, SOLUTION INTRAVENOUS
Refills: 0 | Status: DISCONTINUED | OUTPATIENT
Start: 2021-02-14 | End: 2021-02-16

## 2021-02-14 RX ORDER — SODIUM CHLORIDE 9 MG/ML
300 INJECTION INTRAMUSCULAR; INTRAVENOUS; SUBCUTANEOUS ONCE
Refills: 0 | Status: DISCONTINUED | OUTPATIENT
Start: 2021-02-14 | End: 2021-02-14

## 2021-02-14 RX ORDER — DEXAMETHASONE 0.5 MG/5ML
4 ELIXIR ORAL EVERY 6 HOURS
Refills: 0 | Status: DISCONTINUED | OUTPATIENT
Start: 2021-02-14 | End: 2021-02-17

## 2021-02-14 RX ORDER — OXYTOCIN 10 UNIT/ML
333.33 VIAL (ML) INJECTION
Qty: 20 | Refills: 0 | Status: DISCONTINUED | OUTPATIENT
Start: 2021-02-14 | End: 2021-02-16

## 2021-02-14 RX ORDER — ACETAMINOPHEN 500 MG
975 TABLET ORAL EVERY 6 HOURS
Refills: 0 | Status: COMPLETED | OUTPATIENT
Start: 2021-02-14 | End: 2022-01-13

## 2021-02-14 RX ORDER — METOCLOPRAMIDE HCL 10 MG
10 TABLET ORAL ONCE
Refills: 0 | Status: COMPLETED | OUTPATIENT
Start: 2021-02-14 | End: 2021-02-14

## 2021-02-14 RX ORDER — OXYCODONE HYDROCHLORIDE 5 MG/1
10 TABLET ORAL
Refills: 0 | Status: DISCONTINUED | OUTPATIENT
Start: 2021-02-14 | End: 2021-02-14

## 2021-02-14 RX ADMIN — ONDANSETRON 4 MILLIGRAM(S): 8 TABLET, FILM COATED ORAL at 16:34

## 2021-02-14 RX ADMIN — Medication 50 GM/HR: at 07:10

## 2021-02-14 RX ADMIN — Medication 1000 MILLIUNIT(S)/MIN: at 15:32

## 2021-02-14 RX ADMIN — Medication 400 MILLIGRAM(S): at 23:00

## 2021-02-14 RX ADMIN — Medication 50 GM/HR: at 19:22

## 2021-02-14 RX ADMIN — Medication 30 MILLILITER(S): at 14:00

## 2021-02-14 RX ADMIN — Medication 10 MILLIGRAM(S): at 14:00

## 2021-02-14 RX ADMIN — HEPARIN SODIUM 5000 UNIT(S): 5000 INJECTION INTRAVENOUS; SUBCUTANEOUS at 23:00

## 2021-02-14 RX ADMIN — Medication 400 MILLIGRAM(S): at 17:49

## 2021-02-14 RX ADMIN — Medication 50 GM/HR: at 18:31

## 2021-02-14 RX ADMIN — FAMOTIDINE 20 MILLIGRAM(S): 10 INJECTION INTRAVENOUS at 14:00

## 2021-02-14 NOTE — OB PROVIDER LABOR PROGRESS NOTE - NS_OBIHIFHRDETAILS_OBGYN_ALL_OB_FT
120s, mod hubert, +accels, -decels
EFM: 120/mod. variability/+accles/-decels
baseline 120 mod hubert +accel no decel
Cat 1
Cat 1

## 2021-02-14 NOTE — CHART NOTE - NSCHARTNOTEFT_GEN_A_CORE
R1 Chart note    Pt seen at bedside due to severe BP. Pt states that her arm was bent during the recording. Denies fever, headache, CP, SOB, abdominal pain and edema    Vital Signs Last 24 Hrs  T(C): 36.8 (2021 03:02), Max: 37.0 (2021 05:11)  T(F): 98.24 (2021 03:02), Max: 98.6 (2021 05:11)  HR: 100 (2021 04:42) (65 - 116)  BP: 156/95 (2021 04:41) (109/66 - 162/102)  BP(mean): --  RR: --  SpO2: 99% (2021 04:42) (83% - 100%)    Gen NAD  Abd soft nontender  Ext trace edema    A/P  IOL sPEC on Mg  - continuous monitoring  - continue to monitor BPs and HELLP  - routine labor care   - anticipate     d/w Dr. Marisol Mera PGY1

## 2021-02-14 NOTE — OB RN DELIVERY SUMMARY - NS_SEPSISRSKCALC_OBGYN_ALL_OB_FT
EOS calculated successfully. EOS Risk Factor: 0.5/1000 live births ([facility] national incidence); GA=36w6d; Temp=98.8; ROM=7.317; GBS='Negative'; Antibiotics='No antibiotics or any antibiotics < 2 hrs prior to birth'

## 2021-02-14 NOTE — OB NEONATOLOGY/PEDIATRICIAN DELIVERY SUMMARY - NSPEDSNEONOTESA_OBGYN_ALL_OB_FT
Baby boy born at 36.6 wks via CS to a 29 y/o  O+ blood type mother. Prenatal history of IOL for SPEC on magnesium, arrest of descent, cat II tracing. History of COVID in 2020. PNL nr/immune/-, GBS - on . No rupture, no labor, Baby emerged vigorous, crying, was w/d/s/s with APGARS of . Mom would like to breast/bottle feed, requests/declines Hep B and consents/declines circ. EOS Baby boy born at 36.6 wks via CS to a 27 y/o  O+ blood type mother. Prenatal history of IOL for SPEC on magnesium, arrest of descent, cat II tracing. History of COVID in 2020. PNL nr/immune/-, GBS - on . No rupture, no labor, Baby emerged poor tone, spontaneous cry, was w/d/s/s with APGARS of 6/8. Received CPAP  until 8MOL for shallow breathing, to maintain appropriate oxygen saturation for age. Weaned to room air with O2 sat in mid to high 90s by 10MOL, tone improved by 10 MOL. Mom would like to breast feed, requests Hep B and consents circ. EOS 0.13. Maternal temp 36.6. Covid neg.

## 2021-02-14 NOTE — CHART NOTE - NSCHARTNOTEFT_GEN_A_CORE
Pt seen at bedside to discuss plan of care.    Pt examined by Dr Dao and found to be same exam since 7AM despite augmentation with pitocin and AROM.  Tracing now cat II with late decels.  Discussed with patient, risks, benefits, alternatives of  discussed. Pt consents to .    TLal PGY4

## 2021-02-14 NOTE — OB PROVIDER DELIVERY SUMMARY - NSPROVIDERDELIVERYNOTE_OBGYN_ALL_OB_FT
Liveborn infant Male Apgars 6/8, Wt 6#1  QBL: 712  EBL: 1100  IVF 2700  UOP: 200  Uncomplicated pLTCS for Cat II tracing and Arrest of dilation

## 2021-02-14 NOTE — OB PROVIDER LABOR PROGRESS NOTE - ASSESSMENT
- pt AROM and IUPC placed    TLal PGY4  d/w Dr Barrera
31yo  sPEC/Mg s/p PO and CB  - continue pitocin  - continuous monitoring  - routine labor care    d/w Dr. Marisol Mera PGY1
IOL sPEC on Mg  - continue pitocin  - continuous monitoring  - routine labor care    d/w Dr. Marisol Mera PGY1
Plan   balloon in place  cont PO cytotec  cont EFM/Elida   Anticipate     Kaila Grimm MD PGY1  Plan previously discussed with Dr. Sosa, Dr. Green
Plan: 29y/o  @36w4d in stable condition  - Con't IOL w/ po cytotec  - Continuous EFM, Lindsay  - Con't IVF    D/W attending physician Dr. Cathy Cruz MD  PGY-1
- epidural top off given  -c/w pitocin  d/w Dr. Bruce Mccullough PGY1

## 2021-02-14 NOTE — OB PROVIDER LABOR PROGRESS NOTE - NS_SUBJECTIVE/OBJECTIVE_OBGYN_ALL_OB_FT
Evaluated for cervical change. Feeling increased rectal pressure.
R1 Progress Note     Patient examined to check if balloon was still in place. Balloon in place, exam unchanged
PGY1 Labor & Delivery Progress Note     Pt seen & examined at bedside. Cervical balloon placed without incidence.    T(C): 36.8 (02-12-21 @ 21:14), Max: 37.1 (02-12-21 @ 14:52)  HR: 88 (02-12-21 @ 21:35) (45 - 111)  BP: 135/84 (02-12-21 @ 21:28) (124/63 - 158/87)  RR: 18 (02-12-21 @ 18:03) (16 - 18)  SpO2: 98% (02-12-21 @ 21:35) (97% - 100%)
Pt examined at bedside for pressure
Pt examined at bedside for pressure
pt seen at bedside due to inc pressure

## 2021-02-14 NOTE — CHART NOTE - NSCHARTNOTEFT_GEN_A_CORE
ATT Note:  Pt evaluated and examined by me. Cervix 4.5 cm dilated at best/70%/-3 station, FHR with late decelerations though some accels. Discussed these findings with pt who has been on pitocin, now 12 mu/min. She understands arrest of dilation since last vonda and agrees to proceed with CS. Anesthesiologist and all appropriate staff notified. Informed consent obtained.

## 2021-02-15 ENCOUNTER — TRANSCRIPTION ENCOUNTER (OUTPATIENT)
Age: 31
End: 2021-02-15

## 2021-02-15 LAB
ALBUMIN SERPL ELPH-MCNC: 2.7 G/DL — LOW (ref 3.3–5)
ALP SERPL-CCNC: 83 U/L — SIGNIFICANT CHANGE UP (ref 40–120)
ALT FLD-CCNC: 7 U/L — SIGNIFICANT CHANGE UP (ref 4–33)
ANION GAP SERPL CALC-SCNC: 10 MMOL/L — SIGNIFICANT CHANGE UP (ref 7–14)
APTT BLD: 27.5 SEC — SIGNIFICANT CHANGE UP (ref 27–36.3)
AST SERPL-CCNC: 14 U/L — SIGNIFICANT CHANGE UP (ref 4–32)
BASOPHILS # BLD AUTO: 0.01 K/UL — SIGNIFICANT CHANGE UP (ref 0–0.2)
BASOPHILS NFR BLD AUTO: 0.1 % — SIGNIFICANT CHANGE UP (ref 0–2)
BILIRUB SERPL-MCNC: <0.2 MG/DL — SIGNIFICANT CHANGE UP (ref 0.2–1.2)
BUN SERPL-MCNC: 3 MG/DL — LOW (ref 7–23)
CALCIUM SERPL-MCNC: 7.9 MG/DL — LOW (ref 8.4–10.5)
CHLORIDE SERPL-SCNC: 98 MMOL/L — SIGNIFICANT CHANGE UP (ref 98–107)
CO2 SERPL-SCNC: 26 MMOL/L — SIGNIFICANT CHANGE UP (ref 22–31)
CREAT SERPL-MCNC: 0.58 MG/DL — SIGNIFICANT CHANGE UP (ref 0.5–1.3)
EOSINOPHIL # BLD AUTO: 0.01 K/UL — SIGNIFICANT CHANGE UP (ref 0–0.5)
EOSINOPHIL NFR BLD AUTO: 0.1 % — SIGNIFICANT CHANGE UP (ref 0–6)
FIBRINOGEN PPP-MCNC: 681 MG/DL — HIGH (ref 290–520)
GLUCOSE SERPL-MCNC: 98 MG/DL — SIGNIFICANT CHANGE UP (ref 70–99)
HCT VFR BLD CALC: 27.2 % — LOW (ref 34.5–45)
HGB BLD-MCNC: 9 G/DL — LOW (ref 11.5–15.5)
IANC: 12.59 K/UL — HIGH (ref 1.5–8.5)
IMM GRANULOCYTES NFR BLD AUTO: 0.7 % — SIGNIFICANT CHANGE UP (ref 0–1.5)
INR BLD: 1.06 RATIO — SIGNIFICANT CHANGE UP (ref 0.88–1.16)
LDH SERPL L TO P-CCNC: 253 U/L — HIGH (ref 135–225)
LYMPHOCYTES # BLD AUTO: 1.41 K/UL — SIGNIFICANT CHANGE UP (ref 1–3.3)
LYMPHOCYTES # BLD AUTO: 9.3 % — LOW (ref 13–44)
MAGNESIUM SERPL-MCNC: 4.7 MG/DL — HIGH (ref 1.6–2.6)
MAGNESIUM SERPL-MCNC: 4.9 MG/DL — HIGH (ref 1.6–2.6)
MCHC RBC-ENTMCNC: 29 PG — SIGNIFICANT CHANGE UP (ref 27–34)
MCHC RBC-ENTMCNC: 33.1 GM/DL — SIGNIFICANT CHANGE UP (ref 32–36)
MCV RBC AUTO: 87.7 FL — SIGNIFICANT CHANGE UP (ref 80–100)
MONOCYTES # BLD AUTO: 1.04 K/UL — HIGH (ref 0–0.9)
MONOCYTES NFR BLD AUTO: 6.9 % — SIGNIFICANT CHANGE UP (ref 2–14)
NEUTROPHILS # BLD AUTO: 12.59 K/UL — HIGH (ref 1.8–7.4)
NEUTROPHILS NFR BLD AUTO: 82.9 % — HIGH (ref 43–77)
NRBC # BLD: 0 /100 WBCS — SIGNIFICANT CHANGE UP
NRBC # FLD: 0 K/UL — SIGNIFICANT CHANGE UP
PLATELET # BLD AUTO: 176 K/UL — SIGNIFICANT CHANGE UP (ref 150–400)
POTASSIUM SERPL-MCNC: 3.5 MMOL/L — SIGNIFICANT CHANGE UP (ref 3.5–5.3)
POTASSIUM SERPL-SCNC: 3.5 MMOL/L — SIGNIFICANT CHANGE UP (ref 3.5–5.3)
PROT SERPL-MCNC: 5.5 G/DL — LOW (ref 6–8.3)
PROTHROM AB SERPL-ACNC: 12.1 SEC — SIGNIFICANT CHANGE UP (ref 10.6–13.6)
RBC # BLD: 3.1 M/UL — LOW (ref 3.8–5.2)
RBC # FLD: 13.7 % — SIGNIFICANT CHANGE UP (ref 10.3–14.5)
SODIUM SERPL-SCNC: 134 MMOL/L — LOW (ref 135–145)
URATE SERPL-MCNC: 5.6 MG/DL — SIGNIFICANT CHANGE UP (ref 2.5–7)
WBC # BLD: 15.16 K/UL — HIGH (ref 3.8–10.5)
WBC # FLD AUTO: 15.16 K/UL — HIGH (ref 3.8–10.5)

## 2021-02-15 RX ORDER — ACETAMINOPHEN 500 MG
3 TABLET ORAL
Qty: 0 | Refills: 0 | DISCHARGE
Start: 2021-02-15

## 2021-02-15 RX ORDER — OXYCODONE HYDROCHLORIDE 5 MG/1
5 TABLET ORAL
Refills: 0 | Status: DISCONTINUED | OUTPATIENT
Start: 2021-02-15 | End: 2021-02-17

## 2021-02-15 RX ORDER — ACETAMINOPHEN 500 MG
975 TABLET ORAL EVERY 6 HOURS
Refills: 0 | Status: DISCONTINUED | OUTPATIENT
Start: 2021-02-15 | End: 2021-02-17

## 2021-02-15 RX ORDER — IBUPROFEN 200 MG
600 TABLET ORAL EVERY 6 HOURS
Refills: 0 | Status: DISCONTINUED | OUTPATIENT
Start: 2021-02-15 | End: 2021-02-17

## 2021-02-15 RX ORDER — KETOROLAC TROMETHAMINE 30 MG/ML
30 SYRINGE (ML) INJECTION EVERY 6 HOURS
Refills: 0 | Status: DISCONTINUED | OUTPATIENT
Start: 2021-02-15 | End: 2021-02-15

## 2021-02-15 RX ADMIN — Medication 50 GM/HR: at 07:12

## 2021-02-15 RX ADMIN — OXYCODONE HYDROCHLORIDE 5 MILLIGRAM(S): 5 TABLET ORAL at 18:47

## 2021-02-15 RX ADMIN — Medication 600 MILLIGRAM(S): at 21:01

## 2021-02-15 RX ADMIN — HEPARIN SODIUM 5000 UNIT(S): 5000 INJECTION INTRAVENOUS; SUBCUTANEOUS at 11:59

## 2021-02-15 RX ADMIN — Medication 400 MILLIGRAM(S): at 05:10

## 2021-02-15 RX ADMIN — Medication 975 MILLIGRAM(S): at 18:15

## 2021-02-15 RX ADMIN — Medication 30 MILLIGRAM(S): at 11:20

## 2021-02-15 RX ADMIN — Medication 975 MILLIGRAM(S): at 23:55

## 2021-02-15 RX ADMIN — HEPARIN SODIUM 5000 UNIT(S): 5000 INJECTION INTRAVENOUS; SUBCUTANEOUS at 23:55

## 2021-02-15 NOTE — DISCHARGE NOTE OB - ADDITIONAL INSTRUCTIONS
Make your follow-up appointment with your doctor as ordered. Call clinic to schedule follow-up postpartum appointment in 4-6 weeks. No heavy lifting, driving, or strenuous activity for 6 weeks. Nothing per vagina such as tampons, intercourse, douches or tub baths for 6 weeks or until you see your doctor. Call your doctor with any signs and symptoms of infection such as fever, chills, nausea or vomiting. Call your doctor if you’re unable to tolerate food, increase in vaginal bleeding, or have difficulty urinating. Call your doctor if you have pain that is not relieved by your prescribed medications. Notify your doctor with any other concerns.    You have been provided with a blood pressure cuff prescription. Please measure your blood pressure 3 times a day. If your BP is >150/100, or if you have persistent headache, vision changes, or upper abdominal pain, please call your doctor or come to the hospital. Please make an appointment in 2-7 days for a blood pressure check in your doctor's office. Make your follow-up appointment with your doctor as ordered. Call Dr Escalante office to schedule follow-up postpartum appointment in  48 hours for BP checks and then for 4-6 weeks. No heavy lifting, driving, or strenuous activity for 6 weeks. Nothing per vagina such as tampons, intercourse, douches or tub baths for 6 weeks or until you see your doctor. Call your doctor with any signs and symptoms of infection such as fever, chills, nausea or vomiting. Call your doctor if you’re unable to tolerate food, increase in vaginal bleeding, or have difficulty urinating. Call your doctor if you have pain that is not relieved by your prescribed medications. Notify your doctor with any other concerns.    You have been provided with a blood pressure cuff prescription. Please measure your blood pressure 3 times a day. If your BP is >150/100, or if you have persistent headache, vision changes, or upper abdominal pain, please call your doctor or come to the hospital. Please make an appointment in 2-7 days for a blood pressure check in your doctor's office.

## 2021-02-15 NOTE — PROGRESS NOTE ADULT - PROBLEM SELECTOR PLAN 1
- Continue with pain mgmt  - D/C Mg at 2pm  - AM HELLP labs  - Increase ambulation  - Continue regular diet  - Renew IV fluids  - Check CBC  - Incision dressing removed    GOOD Cruz, PGY-1 - Continue with pain mgmt  - D/C Mg at 2pm  - AM HELLP labs  - D/C pyle  - Continue regular diet  - Renew IV fluids  - Check CBC  - Incision dressing removed    GOOD Cruz, PGY-1

## 2021-02-15 NOTE — PROGRESS NOTE ADULT - ASSESSMENT
31y/o POD#1 s/p pLTCS 2/2 arrest and NRFHT c/b PEC w/ severe features on Mg ppx in stable condition. Patient is progressing well, meeting appropriate postpartum milestones. Tolerating PO, no N/V. Ambulating without difficulty. BPs overnight mild range. 31y/o POD#1 s/p pLTCS 2/2 arrest and NRFHT c/b PEC w/ severe features on Mg ppx in stable condition. Patient is progressing well, meeting appropriate postpartum milestones. Tolerating PO, no N/V.BPs overnight mild range.

## 2021-02-15 NOTE — DISCHARGE NOTE OB - PLAN OF CARE
Make your follow-up appointment with your doctor as ordered. Call clinic to schedule follow-up postpartum appointment in 4-6 weeks. No heavy lifting, driving, or strenuous activity for 6 weeks. Nothing per vagina such as tampons, intercourse, douches or tub baths for 6 weeks or until you see your doctor. Call your doctor with any signs and symptoms of infection such as fever, chills, nausea or vomiting. Call your doctor if you’re unable to tolerate food, increase in vaginal bleeding, or have difficulty urinating. Call your doctor if you have pain that is not relieved by your prescribed medications. Notify your doctor with any other concerns. blood pressure control return to baseline Please measure your blood pressure 3 times a day. If your BP is >150/100, or if you have persistent headache, vision changes, or upper abdominal pain, please call your doctor or come to the hospital. Please make an appointment in 2-7 days for a blood pressure check in your doctor's office. You have been provided with a blood pressure cuff prescription. Please measure your blood pressure 3 times a day. If your BP is >150/100, or if you have persistent headache, vision changes, or upper abdominal pain, please call your doctor or come to the hospital. Please make an appointment in 2-7 days for a blood pressure check in your doctor's office. You have been provided with a blood pressure cuff prescription. Please measure your blood pressure 3 times a day as directed. If your BP is >150/100, or if you have persistent headache, vision changes, or upper abdominal pain, please call your doctor or come to the hospital. Please make an appointment in 2-7 days for a blood pressure check in your doctor's office. Make your follow-up appointment with your doctor as ordered. Call Dr Escalante office for BP check in 48 hours to schedule follow-up postpartum appointment in 4-6 weeks. No heavy lifting, driving, or strenuous activity for 6 weeks. Nothing per vagina such as tampons, intercourse, douches or tub baths for 6 weeks or until you see your doctor. Call your doctor with any signs and symptoms of infection such as fever, chills, nausea or vomiting. Call your doctor if you’re unable to tolerate food, increase in vaginal bleeding, or have difficulty urinating. Call your doctor if you have pain that is not relieved by your prescribed medications. Notify your doctor with any other concerns. You have been provided with a blood pressure cuff prescription. Please measure your blood pressure 3 times a day as directed. If your BP is >150/100, or if you have persistent headache, vision changes, or upper abdominal pain, please call your doctor or come to the hospital. Please make an appointment in 48 hours for BP check  for a blood pressure check in your doctor's office.

## 2021-02-15 NOTE — DISCHARGE NOTE OB - MEDICATION SUMMARY - MEDICATIONS TO TAKE
I will START or STAY ON the medications listed below when I get home from the hospital:    ibuprofen 600 mg oral tablet  -- 1 tab(s) by mouth every 6 hours, As Needed  -- Indication: For pain    Tylenol 325 mg oral tablet  -- 3 tab(s) by mouth every 6 hours, As Needed  -- Indication: For pain    PNV Prenatal oral tablet  -- 1 tab(s) by mouth once a day  -- Indication: For Vitamin   I will START or STAY ON the medications listed below when I get home from the hospital:    Blood Pressure Cuff  -- Monitor your blood pressure 3 times daily as directed.   -- Indication: For HTN    ibuprofen 600 mg oral tablet  -- 1 tab(s) by mouth every 6 hours, As Needed  -- Indication: For pain    Tylenol 325 mg oral tablet  -- 3 tab(s) by mouth every 6 hours, As Needed  -- Indication: For pain    PNV Prenatal oral tablet  -- 1 tab(s) by mouth once a day  -- Indication: For Vitamin

## 2021-02-15 NOTE — PROGRESS NOTE ADULT - SUBJECTIVE AND OBJECTIVE BOX
Patient seen and examined at bedside, no acute overnight events. No acute complaints, pain well controlled. Patient is ambulating and tolerating regular diet. Has not yet passed flatus. Denies CP, SOB, N/V, HA, blurred vision, epigastric pain.    Vital Signs Last 24 Hours  T(C): 36.8 (02-15-21 @ 01:00), Max: 37.2 (02-14-21 @ 19:17)  HR: 76 (02-15-21 @ 01:00) (65 - 106)  BP: 120/67 (02-15-21 @ 01:00) (110/96 - 168/96)  RR: 16 (02-15-21 @ 01:00) (14 - 21)  SpO2: 100% (02-15-21 @ 01:00) (88% - 100%)    I&O's Summary    13 Feb 2021 07:01  -  14 Feb 2021 07:00  --------------------------------------------------------  IN: 2350 mL / OUT: 3500 mL / NET: -1150 mL    14 Feb 2021 07:01  -  15 Feb 2021 01:57  --------------------------------------------------------  IN: 4100 mL / OUT: 3862 mL / NET: 238 mL        Physical Exam:  General: NAD  Abdomen: Soft, non-tender, non-distended, fundus firm  Incision: Pfannenstiel incision CDI, subcuticular suture closure  Pelvic: Lochia wnl    Labs:    Blood Type: O Positive  Antibody Screen: Negative  RPR: Negative               11.4   11.46 )-----------( 179      ( 02-14 @ 09:13 )             34.5                11.1   10.11 )-----------( 166      ( 02-13 @ 21:04 )             34.6                11.1   9.95  )-----------( 176      ( 02-12 @ 21:49 )             34.2         MEDICATIONS  (STANDING):  acetaminophen   Tablet .. 975 milliGRAM(s) Oral every 6 hours  acetaminophen  IVPB .. 1000 milliGRAM(s) IV Intermittent every 6 hours  diphtheria/tetanus/pertussis (acellular) Vaccine (ADAcel) 0.5 milliLiter(s) IntraMuscular once  heparin   Injectable 5000 Unit(s) SubCutaneous every 12 hours  influenza   Vaccine 0.5 milliLiter(s) IntraMuscular once  lactated ringers. 1000 milliLiter(s) (50 mL/Hr) IV Continuous <Continuous>  magnesium sulfate Infusion 2 Gm/Hr (50 mL/Hr) IV Continuous <Continuous>  morphine PF Spinal 0.1 milliGRAM(s) IntraThecal. once  oxytocin Infusion 333.333 milliUNIT(s)/Min (1000 mL/Hr) IV Continuous <Continuous>  oxytocin Infusion 333.333 milliUNIT(s)/Min (1000 mL/Hr) IV Continuous <Continuous>  oxytocin Infusion 333.333 milliUNIT(s)/Min (1000 mL/Hr) IV Continuous <Continuous>    MEDICATIONS  (PRN):  dexAMETHasone  Injectable 4 milliGRAM(s) IV Push every 6 hours PRN Nausea  diphenhydrAMINE 25 milliGRAM(s) Oral every 6 hours PRN Pruritus  lanolin Ointment 1 Application(s) Topical every 6 hours PRN Sore Nipples  magnesium hydroxide Suspension 30 milliLiter(s) Oral two times a day PRN Constipation  metoclopramide Injectable 10 milliGRAM(s) IV Push once PRN Nausea and/or Vomiting  nalbuphine Injectable 2.5 milliGRAM(s) IV Push every 6 hours PRN Pruritus  naloxone Injectable 0.1 milliGRAM(s) IV Push every 3 minutes PRN For ANY of the following changes in patient status:  A. Breaths Per Minute LESS THAN 10, B. Oxygen saturation LESS THAN 90%, C. Sedation score of 6 for Stop After: 4 Times  ondansetron Injectable 4 milliGRAM(s) IV Push every 6 hours PRN Nausea  oxyCODONE    IR 5 milliGRAM(s) Oral every 3 hours PRN Mild Pain (1 - 3)  oxyCODONE    IR 10 milliGRAM(s) Oral every 3 hours PRN Moderate Pain (4 - 6)  oxyCODONE    IR 5 milliGRAM(s) Oral every 3 hours PRN Moderate to Severe Pain (4-10)  oxyCODONE    IR 5 milliGRAM(s) Oral once PRN Moderate to Severe Pain (4-10)  simethicone 80 milliGRAM(s) Chew every 4 hours PRN Gas   Patient seen and examined at bedside, no acute overnight events. No acute complaints, pain well controlled. Patient is tolerating regular diet. Has not yet passed flatus. Tuttle is still in place. Denies CP, SOB, N/V, HA, blurred vision, epigastric pain.    Vital Signs Last 24 Hours  T(C): 36.8 (02-15-21 @ 01:00), Max: 37.2 (02-14-21 @ 19:17)  HR: 76 (02-15-21 @ 01:00) (65 - 106)  BP: 120/67 (02-15-21 @ 01:00) (110/96 - 168/96)  RR: 16 (02-15-21 @ 01:00) (14 - 21)  SpO2: 100% (02-15-21 @ 01:00) (88% - 100%)    I&O's Summary    13 Feb 2021 07:01  -  14 Feb 2021 07:00  --------------------------------------------------------  IN: 2350 mL / OUT: 3500 mL / NET: -1150 mL    14 Feb 2021 07:01  -  15 Feb 2021 01:57  --------------------------------------------------------  IN: 4100 mL / OUT: 3862 mL / NET: 238 mL        Physical Exam:  General: NAD  Abdomen: Soft, non-tender, non-distended, fundus firm  Incision: Pfannenstiel incision CDI, subcuticular suture closure  Pelvic: Lochia wnl    Labs:    Blood Type: O Positive  Antibody Screen: Negative  RPR: Negative               11.4   11.46 )-----------( 179      ( 02-14 @ 09:13 )             34.5                11.1   10.11 )-----------( 166      ( 02-13 @ 21:04 )             34.6                11.1   9.95  )-----------( 176      ( 02-12 @ 21:49 )             34.2         MEDICATIONS  (STANDING):  acetaminophen   Tablet .. 975 milliGRAM(s) Oral every 6 hours  acetaminophen  IVPB .. 1000 milliGRAM(s) IV Intermittent every 6 hours  diphtheria/tetanus/pertussis (acellular) Vaccine (ADAcel) 0.5 milliLiter(s) IntraMuscular once  heparin   Injectable 5000 Unit(s) SubCutaneous every 12 hours  influenza   Vaccine 0.5 milliLiter(s) IntraMuscular once  lactated ringers. 1000 milliLiter(s) (50 mL/Hr) IV Continuous <Continuous>  magnesium sulfate Infusion 2 Gm/Hr (50 mL/Hr) IV Continuous <Continuous>  morphine PF Spinal 0.1 milliGRAM(s) IntraThecal. once  oxytocin Infusion 333.333 milliUNIT(s)/Min (1000 mL/Hr) IV Continuous <Continuous>  oxytocin Infusion 333.333 milliUNIT(s)/Min (1000 mL/Hr) IV Continuous <Continuous>  oxytocin Infusion 333.333 milliUNIT(s)/Min (1000 mL/Hr) IV Continuous <Continuous>    MEDICATIONS  (PRN):  dexAMETHasone  Injectable 4 milliGRAM(s) IV Push every 6 hours PRN Nausea  diphenhydrAMINE 25 milliGRAM(s) Oral every 6 hours PRN Pruritus  lanolin Ointment 1 Application(s) Topical every 6 hours PRN Sore Nipples  magnesium hydroxide Suspension 30 milliLiter(s) Oral two times a day PRN Constipation  metoclopramide Injectable 10 milliGRAM(s) IV Push once PRN Nausea and/or Vomiting  nalbuphine Injectable 2.5 milliGRAM(s) IV Push every 6 hours PRN Pruritus  naloxone Injectable 0.1 milliGRAM(s) IV Push every 3 minutes PRN For ANY of the following changes in patient status:  A. Breaths Per Minute LESS THAN 10, B. Oxygen saturation LESS THAN 90%, C. Sedation score of 6 for Stop After: 4 Times  ondansetron Injectable 4 milliGRAM(s) IV Push every 6 hours PRN Nausea  oxyCODONE    IR 5 milliGRAM(s) Oral every 3 hours PRN Mild Pain (1 - 3)  oxyCODONE    IR 10 milliGRAM(s) Oral every 3 hours PRN Moderate Pain (4 - 6)  oxyCODONE    IR 5 milliGRAM(s) Oral every 3 hours PRN Moderate to Severe Pain (4-10)  oxyCODONE    IR 5 milliGRAM(s) Oral once PRN Moderate to Severe Pain (4-10)  simethicone 80 milliGRAM(s) Chew every 4 hours PRN Gas

## 2021-02-15 NOTE — DISCHARGE NOTE OB - PATIENT PORTAL LINK FT
You can access the FollowMyHealth Patient Portal offered by [facility] by registering at the following website: [email or web address]. By joining [employer]’s FollowMyHealth portal, you will also be able to view your health information using other applications (apps) compatible with our system.

## 2021-02-15 NOTE — DISCHARGE NOTE OB - HOSPITAL COURSE
Patient presented to triage 2/12 for r/o PEC for elevated BPs in office, non severe. Due to persistent headache, patient admitted for induction for sPEC, and was started on magnesium. Patient had pLTCS on 2/14 for NRFHT, EBL 1100. Patient remained on magnesium 24h PP, and postpartum course uncomplicated. Patient discharged on POD#2 in stable condition. Patient to have close follow up with Dr. Crowder. Patient presented to triage 2/12 for r/o PEC for elevated BPs in office, non severe. Due to persistent headache, patient admitted for induction for sPEC, and was started on magnesium. Patient had pLTCS on 2/14 for NRFHT, EBL 1100. Patient remained on magnesium 24h PP, and postpartum course uncomplicated. Patient discharged on POD#3 in stable condition. Patient to have close follow up with Dr. Crowder. Patient presented to triage 2/12 for r/o PEC for non-severe range elevated BPs in office. Due to persistent headache, patient admitted for induction for sPEC, and was started on magnesium. Patient had pLTCS on 2/14 for NRFHT, EBL 1100. Patient remained on magnesium 24h PP, and postpartum course uncomplicated. BPs remained within normal range postpartum without antihypertensive medications. Patient discharged on POD#3 in stable condition. Patient to have close follow up with Dr. Crowder.

## 2021-02-15 NOTE — DISCHARGE NOTE OB - CARE PROVIDER_API CALL
Chrissy Crowder)  Obstetrics and Gynecology  [address], [address]  [place] [address]  Phone: [phone]  Fax: [phone]  Follow Up Time:

## 2021-02-15 NOTE — PROGRESS NOTE ADULT - SUBJECTIVE AND OBJECTIVE BOX
ANESTHESIA POSTOP CHECK    30y Female POSTOP DAY 1 S/P     Vital Signs Last 24 Hrs  T(C): 37.2 (15 Feb 2021 05:00), Max: 37.2 (14 Feb 2021 19:17)  T(F): 98.9 (15 Feb 2021 05:00), Max: 98.9 (14 Feb 2021 19:17)  HR: 68 (15 Feb 2021 06:00) (65 - 106)  BP: 124/75 (15 Feb 2021 06:00) (110/96 - 168/96)  BP(mean): 98 (14 Feb 2021 17:25) (85 - 100)  RR: 16 (15 Feb 2021 06:00) (14 - 21)  SpO2: 100% (15 Feb 2021 06:00) (90% - 100%)  I&O's Summary    14 Feb 2021 07:01  -  15 Feb 2021 07:00  --------------------------------------------------------  IN: 4100 mL / OUT: 4062 mL / NET: 38 mL        [X ] NO APPARENT ANESTHESIA COMPLICATIONS      Comments:

## 2021-02-15 NOTE — DISCHARGE NOTE OB - CARE PLAN
Principal Discharge DX:	Vaginal delivery  Goal:	return to baseline  Assessment and plan of treatment:	Make your follow-up appointment with your doctor as ordered. Call clinic to schedule follow-up postpartum appointment in 4-6 weeks. No heavy lifting, driving, or strenuous activity for 6 weeks. Nothing per vagina such as tampons, intercourse, douches or tub baths for 6 weeks or until you see your doctor. Call your doctor with any signs and symptoms of infection such as fever, chills, nausea or vomiting. Call your doctor if you’re unable to tolerate food, increase in vaginal bleeding, or have difficulty urinating. Call your doctor if you have pain that is not relieved by your prescribed medications. Notify your doctor with any other concerns.  Secondary Diagnosis:	Severe pre-eclampsia  Goal:	blood pressure control  Assessment and plan of treatment:	Please measure your blood pressure 3 times a day. If your BP is >150/100, or if you have persistent headache, vision changes, or upper abdominal pain, please call your doctor or come to the hospital. Please make an appointment in 2-7 days for a blood pressure check in your doctor's office.   Principal Discharge DX:	Vaginal delivery  Goal:	return to baseline  Assessment and plan of treatment:	Make your follow-up appointment with your doctor as ordered. Call clinic to schedule follow-up postpartum appointment in 4-6 weeks. No heavy lifting, driving, or strenuous activity for 6 weeks. Nothing per vagina such as tampons, intercourse, douches or tub baths for 6 weeks or until you see your doctor. Call your doctor with any signs and symptoms of infection such as fever, chills, nausea or vomiting. Call your doctor if you’re unable to tolerate food, increase in vaginal bleeding, or have difficulty urinating. Call your doctor if you have pain that is not relieved by your prescribed medications. Notify your doctor with any other concerns.  Secondary Diagnosis:	Severe pre-eclampsia  Goal:	blood pressure control  Assessment and plan of treatment:	You have been provided with a blood pressure cuff prescription. Please measure your blood pressure 3 times a day. If your BP is >150/100, or if you have persistent headache, vision changes, or upper abdominal pain, please call your doctor or come to the hospital. Please make an appointment in 2-7 days for a blood pressure check in your doctor's office.   Principal Discharge DX:	Vaginal delivery  Goal:	return to baseline  Assessment and plan of treatment:	Make your follow-up appointment with your doctor as ordered. Call clinic to schedule follow-up postpartum appointment in 4-6 weeks. No heavy lifting, driving, or strenuous activity for 6 weeks. Nothing per vagina such as tampons, intercourse, douches or tub baths for 6 weeks or until you see your doctor. Call your doctor with any signs and symptoms of infection such as fever, chills, nausea or vomiting. Call your doctor if you’re unable to tolerate food, increase in vaginal bleeding, or have difficulty urinating. Call your doctor if you have pain that is not relieved by your prescribed medications. Notify your doctor with any other concerns.  Secondary Diagnosis:	Severe pre-eclampsia  Goal:	blood pressure control  Assessment and plan of treatment:	You have been provided with a blood pressure cuff prescription. Please measure your blood pressure 3 times a day as directed. If your BP is >150/100, or if you have persistent headache, vision changes, or upper abdominal pain, please call your doctor or come to the hospital. Please make an appointment in 2-7 days for a blood pressure check in your doctor's office.   Principal Discharge DX:	Vaginal delivery  Goal:	return to baseline  Assessment and plan of treatment:	Make your follow-up appointment with your doctor as ordered. Call Dr Escalante office for BP check in 48 hours to schedule follow-up postpartum appointment in 4-6 weeks. No heavy lifting, driving, or strenuous activity for 6 weeks. Nothing per vagina such as tampons, intercourse, douches or tub baths for 6 weeks or until you see your doctor. Call your doctor with any signs and symptoms of infection such as fever, chills, nausea or vomiting. Call your doctor if you’re unable to tolerate food, increase in vaginal bleeding, or have difficulty urinating. Call your doctor if you have pain that is not relieved by your prescribed medications. Notify your doctor with any other concerns.  Secondary Diagnosis:	Severe pre-eclampsia  Goal:	blood pressure control  Assessment and plan of treatment:	You have been provided with a blood pressure cuff prescription. Please measure your blood pressure 3 times a day as directed. If your BP is >150/100, or if you have persistent headache, vision changes, or upper abdominal pain, please call your doctor or come to the hospital. Please make an appointment in 48 hours for BP check  for a blood pressure check in your doctor's office.

## 2021-02-16 DIAGNOSIS — O14.10 SEVERE PRE-ECLAMPSIA, UNSPECIFIED TRIMESTER: ICD-10-CM

## 2021-02-16 RX ORDER — IBUPROFEN 200 MG
1 TABLET ORAL
Qty: 0 | Refills: 0 | DISCHARGE
Start: 2021-02-16

## 2021-02-16 RX ORDER — ASCORBIC ACID 60 MG
500 TABLET,CHEWABLE ORAL DAILY
Refills: 0 | Status: DISCONTINUED | OUTPATIENT
Start: 2021-02-16 | End: 2021-02-17

## 2021-02-16 RX ORDER — SENNA PLUS 8.6 MG/1
1 TABLET ORAL DAILY
Refills: 0 | Status: DISCONTINUED | OUTPATIENT
Start: 2021-02-16 | End: 2021-02-17

## 2021-02-16 RX ORDER — FERROUS SULFATE 325(65) MG
325 TABLET ORAL THREE TIMES A DAY
Refills: 0 | Status: DISCONTINUED | OUTPATIENT
Start: 2021-02-16 | End: 2021-02-17

## 2021-02-16 RX ADMIN — Medication 600 MILLIGRAM(S): at 12:49

## 2021-02-16 RX ADMIN — Medication 325 MILLIGRAM(S): at 06:28

## 2021-02-16 RX ADMIN — Medication 325 MILLIGRAM(S): at 21:08

## 2021-02-16 RX ADMIN — HEPARIN SODIUM 5000 UNIT(S): 5000 INJECTION INTRAVENOUS; SUBCUTANEOUS at 15:14

## 2021-02-16 RX ADMIN — SIMETHICONE 80 MILLIGRAM(S): 80 TABLET, CHEWABLE ORAL at 21:15

## 2021-02-16 RX ADMIN — Medication 600 MILLIGRAM(S): at 18:46

## 2021-02-16 RX ADMIN — Medication 1 TABLET(S): at 15:14

## 2021-02-16 RX ADMIN — Medication 975 MILLIGRAM(S): at 15:15

## 2021-02-16 RX ADMIN — Medication 500 MILLIGRAM(S): at 15:14

## 2021-02-16 RX ADMIN — Medication 600 MILLIGRAM(S): at 04:34

## 2021-02-16 RX ADMIN — Medication 975 MILLIGRAM(S): at 21:08

## 2021-02-16 RX ADMIN — Medication 975 MILLIGRAM(S): at 06:28

## 2021-02-16 RX ADMIN — Medication 600 MILLIGRAM(S): at 23:57

## 2021-02-16 RX ADMIN — OXYCODONE HYDROCHLORIDE 5 MILLIGRAM(S): 5 TABLET ORAL at 04:34

## 2021-02-16 RX ADMIN — SENNA PLUS 1 TABLET(S): 8.6 TABLET ORAL at 15:14

## 2021-02-16 RX ADMIN — Medication 325 MILLIGRAM(S): at 15:14

## 2021-02-16 NOTE — PROGRESS NOTE ADULT - SUBJECTIVE AND OBJECTIVE BOX
OB Progress Note: LTCS, POD#2    S: 31yo sPEC s/p Mg POD#2 s/p pLTCS 2/2 arrest of dilation and NRFHT. BPs well controlled without antihypertensives. Pain is well controlled. She is tolerating a regular diet and passing flatus. She is voiding spontaneously, and ambulating without difficulty. Denies CP/SOB. Denies headache/blurred vision/lightheadedness/dizziness. Denies N/V/RUQ pain. Denies leg swelling.    O:  Vitals:  Vital Signs Last 24 Hrs  T(C): 36.8 (16 Feb 2021 05:55), Max: 36.8 (16 Feb 2021 01:15)  T(F): 98.3 (16 Feb 2021 05:55), Max: 98.3 (16 Feb 2021 05:55)  HR: 84 (16 Feb 2021 05:55) (78 - 91)  BP: 128/80 (16 Feb 2021 05:55) (112/64 - 131/82)  BP(mean): --  RR: 17 (16 Feb 2021 05:55) (16 - 18)  SpO2: 97% (16 Feb 2021 05:55) (97% - 100%)    MEDICATIONS  (STANDING):  acetaminophen   Tablet .. 975 milliGRAM(s) Oral every 6 hours  ascorbic acid 500 milliGRAM(s) Oral daily  diphtheria/tetanus/pertussis (acellular) Vaccine (ADAcel) 0.5 milliLiter(s) IntraMuscular once  ferrous    sulfate 325 milliGRAM(s) Oral three times a day  heparin   Injectable 5000 Unit(s) SubCutaneous every 12 hours  ibuprofen  Tablet. 600 milliGRAM(s) Oral every 6 hours  influenza   Vaccine 0.5 milliLiter(s) IntraMuscular once  morphine PF Spinal 0.1 milliGRAM(s) IntraThecal. once  prenatal multivitamin 1 Tablet(s) Oral daily  senna 1 Tablet(s) Oral daily      MEDICATIONS  (PRN):  dexAMETHasone  Injectable 4 milliGRAM(s) IV Push every 6 hours PRN Nausea  diphenhydrAMINE 25 milliGRAM(s) Oral every 6 hours PRN Pruritus  lanolin Ointment 1 Application(s) Topical every 6 hours PRN Sore Nipples  magnesium hydroxide Suspension 30 milliLiter(s) Oral two times a day PRN Constipation  metoclopramide Injectable 10 milliGRAM(s) IV Push once PRN Nausea and/or Vomiting  nalbuphine Injectable 2.5 milliGRAM(s) IV Push every 6 hours PRN Pruritus  naloxone Injectable 0.1 milliGRAM(s) IV Push every 3 minutes PRN For ANY of the following changes in patient status:  A. Breaths Per Minute LESS THAN 10, B. Oxygen saturation LESS THAN 90%, C. Sedation score of 6 for Stop After: 4 Times  ondansetron Injectable 4 milliGRAM(s) IV Push every 6 hours PRN Nausea  oxyCODONE    IR 5 milliGRAM(s) Oral once PRN Moderate to Severe Pain (4-10)  oxyCODONE    IR 5 milliGRAM(s) Oral every 3 hours PRN Moderate to Severe Pain (4-10)  simethicone 80 milliGRAM(s) Chew every 4 hours PRN Gas      Labs:  Blood type: O Positive  Rubella IgG: RPR: Negative                          9.0<L>   15.16<H> >-----------< 176    ( 02-15 @ 03:20 )             27.2<L>                        11.4<L>   11.46<H> >-----------< 179    ( 02-14 @ 09:13 )             34.5                        11.1<L>   10.11 >-----------< 166    ( 02-13 @ 21:04 )             34.6    02-15-21 @ 03:24      134<L>  |  98  |  3<L>  ----------------------------<  98  3.5   |  26  |  0.58    02-14-21 @ 09:13      136  |  101  |  3<L>  ----------------------------<  87  3.1<L>   |  25  |  0.57    02-13-21 @ 21:04      136  |  99  |  4<L>  ----------------------------<  95  3.0<L>   |  26  |  0.60        Ca    7.9<L>      15 Feb 2021 03:24  Ca    7.4<L>      14 Feb 2021 09:13  Ca    7.3<L>      13 Feb 2021 21:04  Mg     4.9<H>     02-15  Mg     4.7<H>     02-15  Mg     4.1<H>     02-14  Mg     5.6<H>     02-14  Mg     5.1<H>     02-14  Mg     4.8<H>     02-13  Mg     4.9<H>     02-13  Mg     4.9<H>     02-13    TPro  5.5<L>  /  Alb  2.7<L>  /  TBili  <0.2  /  DBili  x   /  AST  14  /  ALT  7   /  AlkPhos  83  02-15-21 @ 03:24  TPro  6.4  /  Alb  3.4  /  TBili  0.4  /  DBili  x   /  AST  14  /  ALT  11  /  AlkPhos  102  02-14-21 @ 09:13  TPro  6.2  /  Alb  3.4  /  TBili  0.3  /  DBili  x   /  AST  17  /  ALT  14  /  AlkPhos  97  02-13-21 @ 21:04    PE:  General: NAD  Abdomen: Soft, appropriately tender, incision c/d/i.  Extremities: No erythema, no pitting edema

## 2021-02-16 NOTE — PROGRESS NOTE ADULT - ATTENDING COMMENTS
Associate Chief of L & D ( late entry)     I have met this patient for the first time today.  She received her care with Dr Escalante and was admitted her  by Dr Ya with PEC with severe features.  She was delivered by Dr Guillen for CAT II and arrest    OB Progress Note:  Delivery, POD#2    S: 29yo POD#2 s/p LTCS .Patient denies any HA, blurred vision or RUQ    O:   Vital Signs Last 24 Hrs  T(C): 36.7 (2021 09:44), Max: 36.8 (2021 01:15)  T(F): 98.1 (2021 09:44), Max: 98.3 (2021 05:55)  HR: 93 (2021 09:44) (78 - 93)  BP: 131/72 (2021 09:44) (112/64 - 131/82)  RR: 18 (2021 09:44) (16 - 18)  SpO2: 100% (2021 09:44) (97% - 100%)    Labs:  Blood type: O Positive  Rubella IgG: RPR: Negative                          9.0<L>   15.16<H> >-----------< 176    ( 02-15 @ 03:20 )             27.2<L>                        11.4<L>   11.46<H> >-----------< 179    (  @ 09:13 )             34.5                        11.1<L>   10.11 >-----------< 166    (  @ 21:04 )             34.6    02-15-21 @ 03:24      134<L>  |  98  |  3<L>  ----------------------------<  98  3.5   |  26  |  0.58    21 @ 09:13      136  |  101  |  3<L>  ----------------------------<  87  3.1<L>   |  25  |  0.57    21 @ 21:04      136  |  99  |  4<L>  ----------------------------<  95  3.0<L>   |  26  |  0.60        Ca    7.9<L>      15 Feb 2021 03:24  Ca    7.3<L>      2021 21:04  Mg     4.9<H>     02-15  Mg     4.9<H>     02-13    TPro  5.5<L>  /  Alb  2.7<L>  /  TBili  <0.2  /  DBili  x   /  AST  14  /  ALT  7   /  AlkPhos  83  02-15-21 @ 03:24  TPro  6.4  /  Alb  3.4  /  TBili  0.4  /  DBili  x   /  AST  14  /  ALT  11  /  AlkPhos  102  21 @ 09:13  TPro  6.2  /  Alb  3.4  /  TBili  0.3  /  DBili  x   /  AST  17  /  ALT  14  /  AlkPhos  97  21 @ 21:04          PE:  General: NAD  Abdomen: soft, fundus firm, Mildly distended, appropriately tender  incision c/d/i.  Extremities: No erythema, no pitting edema    A/P: 29yo POD#2 s/p LTCS for arreest of dilatation and CAT II with PEC with severe features  - Continue regular diet.  - Increase ambulation.  - Continue motrin, tylenol, oxycodone PRN for pain control.  vs. continue PCEA for pain.  -  Monitor BP'S    Modesta Lee M.D., M.B.A., M.S.

## 2021-02-16 NOTE — PROGRESS NOTE ADULT - PROBLEM SELECTOR PLAN 1
- Hct stable postoperatively.  - Continue regular diet.  - Increase ambulation.  - Continue motrin, tylenol, oxycodone PRN for pain control.   - Consents to circumcision.  - Contraception plan: condoms.

## 2021-02-16 NOTE — LACTATION INITIAL EVALUATION - LACTATION INTERVENTIONS
reviewed late   behavior .instructed  to triple  feed  if nbn  not effective .at feedings ./initiate skin to skin/initiate hand expression routine/reverse pressure softening/initiate/review techniques for position and latch/initiate/review finger suck/initiate/review breast massage/compression

## 2021-02-16 NOTE — PROGRESS NOTE ADULT - ASSESSMENT
A/P: 29yo sPEC s/p Mg POD#2 s/p pLTCS 2/2 arrest of dilation and NRFHT. Patient is stable and doing well post-operatively.

## 2021-02-16 NOTE — LACTATION INITIAL EVALUATION - INTERVENTION OUTCOME
nbn demonstrated  deep latch and  performed  with sucking and swallowing  noted . reviewed  strategies to wake  nbn .  offered assistance  as needed  . rn aware  of  plan/verbalizes understanding

## 2021-02-17 VITALS
SYSTOLIC BLOOD PRESSURE: 131 MMHG | TEMPERATURE: 98 F | HEART RATE: 90 BPM | RESPIRATION RATE: 19 BRPM | DIASTOLIC BLOOD PRESSURE: 83 MMHG | OXYGEN SATURATION: 99 %

## 2021-02-17 RX ADMIN — Medication 325 MILLIGRAM(S): at 05:29

## 2021-02-17 RX ADMIN — Medication 975 MILLIGRAM(S): at 13:02

## 2021-02-17 RX ADMIN — HEPARIN SODIUM 5000 UNIT(S): 5000 INJECTION INTRAVENOUS; SUBCUTANEOUS at 04:00

## 2021-02-17 RX ADMIN — Medication 600 MILLIGRAM(S): at 05:29

## 2021-02-17 NOTE — PROGRESS NOTE ADULT - ATTENDING COMMENTS
Associate Chief of L & D ( late entry)      OB Progress Note:  Delivery, POD#3    S: 29yo POD#3 s/p LTCS .Patient denies any HA, blurred vision or RUQ    O:   Vital Signs Last 24 Hrs  T(C): 36.7 (2021 10:21), Max: 37.2 (2021 13:51)  T(F): 98.1 (2021 10:21), Max: 99 (2021 13:51)  HR: 84 (2021 10:21) (80 - 96)  BP: 138/78 (2021 10:21) (131/74 - 138/78)  RR: 18 (2021 10:) (16 - 18)  SpO2: 100% (2021 10:) (98% - 100%)    Labs:  Blood type: O Positive  Rubella IgG: RPR: Negative                          9.0<L>   15.16<H> >-----------< 176    ( 02-15 @ 03:20 )             27.2<L>                        11.4<L>   11.46<H> >-----------< 179    (  @ 09:13 )             34.5                        11.1<L>   10.11 >-----------< 166    (  @ 21:04 )             34.6    02-15-21 @ 03:24      134<L>  |  98  |  3<L>  ----------------------------<  98  3.5   |  26  |  0.58    21 @ 09:13      136  |  101  |  3<L>  ----------------------------<  87  3.1<L>   |  25  |  0.57    21 @ 21:04      136  |  99  |  4<L>  ----------------------------<  95  3.0<L>   |  26  |  0.60        Ca    7.9<L>      15 Feb 2021 03:24  Ca    7.3<L>      2021 21:04  Mg     4.9<H>     02-15  Mg     4.9<H>         TPro  5.5<L>  /  Alb  2.7<L>  /  TBili  <0.2  /  DBili  x   /  AST  14  /  ALT  7   /  AlkPhos  83  02-15-21 @ 03:24  TPro  6.2  /  Alb  3.4  /  TBili  0.3  /  DBili  x   /  AST  17  /  ALT  14  /  AlkPhos  97  21 @ 21:04          PE:  Abdomen: soft, fundus firm, Mildly distended, appropriately tender  incision c/d/i.  Extremities: No erythema, no pitting edema    A/P: 29yo POD#3 s/p LTCS for arrest of dilatation and CAT II with PEC with severe features  - Patient stable for discharge and will follow with Dr Escalante  -  Monitor BP'S    Modesta Lee M.D., M.B.A., M.S.

## 2021-02-17 NOTE — PROGRESS NOTE ADULT - PROBLEM SELECTOR PLAN 1
- Continue motrin, tylenol, oxycodone PRN for pain control.  - Increase ambulation  - Continue regular diet  - Contraception plan: condoms  - Discharge planning

## 2021-02-17 NOTE — PROGRESS NOTE ADULT - SUBJECTIVE AND OBJECTIVE BOX
OB Postpartum Note:  Delivery, POD#3    S: 29yo sPEC s/p Mg POD#3 s/p pLTCS 2/2 NRFHT/arrest of dilation. The patient feels well. BPs well controlled overnight without antihypertensives. Pain is well controlled. She is tolerating a regular diet and passing flatus. She is voiding spontaneously, and ambulating without difficulty. Denies CP/SOB. Denies headache/blurry vision/lightheadedness/dizziness. Denies N/V/RUQ pain. Denies leg swelling.    O:  Vitals:  Vital Signs Last 24 Hrs  T(C): 36.9 (2021 05:00), Max: 37.2 (2021 13:51)  T(F): 98.5 (2021 05:00), Max: 99 (2021 13:51)  HR: 87 (2021 05:00) (80 - 96)  BP: 136/79 (2021 05:00) (131/72 - 137/79)  BP(mean): --  RR: 17 (2021 05:00) (16 - 18)  SpO2: 100% (2021 05:00) (98% - 100%)    MEDICATIONS  (STANDING):  acetaminophen   Tablet .. 975 milliGRAM(s) Oral every 6 hours  ascorbic acid 500 milliGRAM(s) Oral daily  diphtheria/tetanus/pertussis (acellular) Vaccine (ADAcel) 0.5 milliLiter(s) IntraMuscular once  ferrous    sulfate 325 milliGRAM(s) Oral three times a day  heparin   Injectable 5000 Unit(s) SubCutaneous every 12 hours  ibuprofen  Tablet. 600 milliGRAM(s) Oral every 6 hours  morphine PF Spinal 0.1 milliGRAM(s) IntraThecal. once  prenatal multivitamin 1 Tablet(s) Oral daily  senna 1 Tablet(s) Oral daily    MEDICATIONS  (PRN):  dexAMETHasone  Injectable 4 milliGRAM(s) IV Push every 6 hours PRN Nausea  diphenhydrAMINE 25 milliGRAM(s) Oral every 6 hours PRN Pruritus  lanolin Ointment 1 Application(s) Topical every 6 hours PRN Sore Nipples  magnesium hydroxide Suspension 30 milliLiter(s) Oral two times a day PRN Constipation  metoclopramide Injectable 10 milliGRAM(s) IV Push once PRN Nausea and/or Vomiting  nalbuphine Injectable 2.5 milliGRAM(s) IV Push every 6 hours PRN Pruritus  naloxone Injectable 0.1 milliGRAM(s) IV Push every 3 minutes PRN For ANY of the following changes in patient status:  A. Breaths Per Minute LESS THAN 10, B. Oxygen saturation LESS THAN 90%, C. Sedation score of 6 for Stop After: 4 Times  ondansetron Injectable 4 milliGRAM(s) IV Push every 6 hours PRN Nausea  oxyCODONE    IR 5 milliGRAM(s) Oral once PRN Moderate to Severe Pain (4-10)  oxyCODONE    IR 5 milliGRAM(s) Oral every 3 hours PRN Moderate to Severe Pain (4-10)  simethicone 80 milliGRAM(s) Chew every 4 hours PRN Gas      LABS:  Blood type: O Positive  Rubella IgG: RPR: Negative                          9.0<L>   15.16<H> >-----------< 176    ( 02-15 @ 03:20 )             27.2<L>                        11.4<L>   11.46<H> >-----------< 179    (  @ 09:13 )             34.5    02-15-21 @ 03:24      134<L>  |  98  |  3<L>  ----------------------------<  98  3.5   |  26  |  0.58    21 @ 09:13      136  |  101  |  3<L>  ----------------------------<  87  3.1<L>   |  25  |  0.57        Ca    7.9<L>      15 Feb 2021 03:24  Ca    7.4<L>      2021 09:13  Mg     4.9<H>     02-15  Mg     4.7<H>     02-15  Mg     4.1<H>     02-14  Mg     5.6<H>     -14    TPro  5.5<L>  /  Alb  2.7<L>  /  TBili  <0.2  /  DBili  x   /  AST  14  /  ALT  7   /  AlkPhos  83  02-15-21 @ 03:24  TPro  6.4  /  Alb  3.4  /  TBili  0.4  /  DBili  x   /  AST  14  /  ALT  11  /  AlkPhos  102  21 @ 09:13    Physical exam:  Gen: NAD  Abdomen: Soft, nontender, no distension , firm uterine fundus at umbilicus.  Incision: Clean, dry, and intact   Pelvic: Normal lochia noted  Ext: No calf tenderness

## 2021-02-17 NOTE — PROGRESS NOTE ADULT - ASSESSMENT
A/P: 31yo sPEC s/p Mg POD#3 s/p pLTCS 2/2 NRFHT and arrest of dilation. Patient is stable and is doing well post-operatively.

## 2021-02-17 NOTE — PROGRESS NOTE ADULT - PROBLEM SELECTOR PLAN 2
- BPs well controlled without antihypertensives  - Continue to monitor VS  - Patient provided with BP cuff Rx to monitor BPs at home    Birgit Carroll MD PGY1
- BPs well controlled without antihypertensives.  - Continue to monitor VS and symptoms.    Birgit Carroll MD PGY1

## 2021-02-18 ENCOUNTER — NON-APPOINTMENT (OUTPATIENT)
Age: 31
End: 2021-02-18

## 2021-02-18 ENCOUNTER — INPATIENT (INPATIENT)
Facility: HOSPITAL | Age: 31
LOS: 0 days | Discharge: HOME | End: 2021-02-19
Attending: OBSTETRICS & GYNECOLOGY | Admitting: OBSTETRICS & GYNECOLOGY

## 2021-02-18 DIAGNOSIS — O14.90 UNSPECIFIED PRE-ECLAMPSIA, UNSPECIFIED TRIMESTER: ICD-10-CM

## 2021-02-18 DIAGNOSIS — Z98.890 OTHER SPECIFIED POSTPROCEDURAL STATES: Chronic | ICD-10-CM

## 2021-02-18 PROBLEM — Z00.00 ENCOUNTER FOR PREVENTIVE HEALTH EXAMINATION: Status: ACTIVE | Noted: 2021-02-18

## 2021-02-18 LAB
ALBUMIN SERPL ELPH-MCNC: 3.5 G/DL — SIGNIFICANT CHANGE UP (ref 3.3–5)
ALP SERPL-CCNC: 90 U/L — SIGNIFICANT CHANGE UP (ref 40–120)
ALT FLD-CCNC: 27 U/L — SIGNIFICANT CHANGE UP (ref 4–33)
ANION GAP SERPL CALC-SCNC: 12 MMOL/L — SIGNIFICANT CHANGE UP (ref 7–14)
AST SERPL-CCNC: 29 U/L — SIGNIFICANT CHANGE UP (ref 4–32)
BASOPHILS # BLD AUTO: 0.02 K/UL — SIGNIFICANT CHANGE UP (ref 0–0.2)
BASOPHILS NFR BLD AUTO: 0.2 % — SIGNIFICANT CHANGE UP (ref 0–2)
BILIRUB SERPL-MCNC: <0.2 MG/DL — SIGNIFICANT CHANGE UP (ref 0.2–1.2)
BUN SERPL-MCNC: 8 MG/DL — SIGNIFICANT CHANGE UP (ref 7–23)
CALCIUM SERPL-MCNC: 8.8 MG/DL — SIGNIFICANT CHANGE UP (ref 8.4–10.5)
CHLORIDE SERPL-SCNC: 102 MMOL/L — SIGNIFICANT CHANGE UP (ref 98–107)
CO2 SERPL-SCNC: 25 MMOL/L — SIGNIFICANT CHANGE UP (ref 22–31)
CREAT SERPL-MCNC: 0.58 MG/DL — SIGNIFICANT CHANGE UP (ref 0.5–1.3)
EOSINOPHIL # BLD AUTO: 0.38 K/UL — SIGNIFICANT CHANGE UP (ref 0–0.5)
EOSINOPHIL NFR BLD AUTO: 4.4 % — SIGNIFICANT CHANGE UP (ref 0–6)
GLUCOSE SERPL-MCNC: 117 MG/DL — HIGH (ref 70–99)
HCT VFR BLD CALC: 27.9 % — LOW (ref 34.5–45)
HGB BLD-MCNC: 9 G/DL — LOW (ref 11.5–15.5)
IANC: 5.8 K/UL — SIGNIFICANT CHANGE UP (ref 1.5–8.5)
IMM GRANULOCYTES NFR BLD AUTO: 1.3 % — SIGNIFICANT CHANGE UP (ref 0–1.5)
LDH SERPL L TO P-CCNC: 266 U/L — HIGH (ref 135–225)
LYMPHOCYTES # BLD AUTO: 1.7 K/UL — SIGNIFICANT CHANGE UP (ref 1–3.3)
LYMPHOCYTES # BLD AUTO: 19.8 % — SIGNIFICANT CHANGE UP (ref 13–44)
MCHC RBC-ENTMCNC: 29.1 PG — SIGNIFICANT CHANGE UP (ref 27–34)
MCHC RBC-ENTMCNC: 32.3 GM/DL — SIGNIFICANT CHANGE UP (ref 32–36)
MCV RBC AUTO: 90.3 FL — SIGNIFICANT CHANGE UP (ref 80–100)
MONOCYTES # BLD AUTO: 0.59 K/UL — SIGNIFICANT CHANGE UP (ref 0–0.9)
MONOCYTES NFR BLD AUTO: 6.9 % — SIGNIFICANT CHANGE UP (ref 2–14)
NEUTROPHILS # BLD AUTO: 5.8 K/UL — SIGNIFICANT CHANGE UP (ref 1.8–7.4)
NEUTROPHILS NFR BLD AUTO: 67.4 % — SIGNIFICANT CHANGE UP (ref 43–77)
NRBC # BLD: 0 /100 WBCS — SIGNIFICANT CHANGE UP
NRBC # FLD: 0 K/UL — SIGNIFICANT CHANGE UP
PLATELET # BLD AUTO: 274 K/UL — SIGNIFICANT CHANGE UP (ref 150–400)
POTASSIUM SERPL-MCNC: 3.2 MMOL/L — LOW (ref 3.5–5.3)
POTASSIUM SERPL-SCNC: 3.2 MMOL/L — LOW (ref 3.5–5.3)
PROT SERPL-MCNC: 6.5 G/DL — SIGNIFICANT CHANGE UP (ref 6–8.3)
RBC # BLD: 3.09 M/UL — LOW (ref 3.8–5.2)
RBC # FLD: 13.2 % — SIGNIFICANT CHANGE UP (ref 10.3–14.5)
SODIUM SERPL-SCNC: 139 MMOL/L — SIGNIFICANT CHANGE UP (ref 135–145)
URATE SERPL-MCNC: 5.2 MG/DL — SIGNIFICANT CHANGE UP (ref 2.5–7)
WBC # BLD: 8.6 K/UL — SIGNIFICANT CHANGE UP (ref 3.8–10.5)
WBC # FLD AUTO: 8.6 K/UL — SIGNIFICANT CHANGE UP (ref 3.8–10.5)

## 2021-02-18 RX ORDER — METOCLOPRAMIDE HCL 10 MG
10 TABLET ORAL ONCE
Refills: 0 | Status: DISCONTINUED | OUTPATIENT
Start: 2021-02-18 | End: 2021-02-18

## 2021-02-18 RX ORDER — NIFEDIPINE 30 MG
1 TABLET, EXTENDED RELEASE 24 HR ORAL
Qty: 10 | Refills: 0
Start: 2021-02-18 | End: 2021-02-27

## 2021-02-18 RX ORDER — METOCLOPRAMIDE HCL 10 MG
10 TABLET ORAL ONCE
Refills: 0 | Status: COMPLETED | OUTPATIENT
Start: 2021-02-18 | End: 2021-02-18

## 2021-02-18 RX ORDER — DIPHENHYDRAMINE HCL 50 MG
25 CAPSULE ORAL ONCE
Refills: 0 | Status: COMPLETED | OUTPATIENT
Start: 2021-02-18 | End: 2021-02-18

## 2021-02-18 RX ORDER — DIPHENHYDRAMINE HCL 50 MG
25 CAPSULE ORAL ONCE
Refills: 0 | Status: DISCONTINUED | OUTPATIENT
Start: 2021-02-18 | End: 2021-02-18

## 2021-02-18 RX ORDER — NIFEDIPINE 30 MG
30 TABLET, EXTENDED RELEASE 24 HR ORAL ONCE
Refills: 0 | Status: COMPLETED | OUTPATIENT
Start: 2021-02-18 | End: 2021-02-18

## 2021-02-18 RX ADMIN — Medication 30 MILLIGRAM(S): at 23:40

## 2021-02-18 RX ADMIN — Medication 25 MILLIGRAM(S): at 21:45

## 2021-02-18 RX ADMIN — Medication 10 MILLIGRAM(S): at 21:49

## 2021-02-18 NOTE — H&P ADULT - NSHPPHYSICALEXAM_GEN_ALL_CORE
serial bp     abdomen soft, nontender   Steri-strips dry and intact   heart r/r/r   lungs ctab serial bp 125-156/80-98    abdomen soft, nontender   Steri-strips dry and intact   heart r/r/r   lungs ctab

## 2021-02-18 NOTE — H&P ADULT - ASSESSMENT
SPEC w/ features     headache resolved with cocktail     labs wnl     d/w Dr. Mariee     -Cleared for discharge home   -rx for nifedipine 30 mg xl   -follow up in office on 2/22   -warning signs reviewed

## 2021-02-18 NOTE — H&P ADULT - ATTENDING COMMENTS
29 y/o  POD# 4 s/p  delivery with pregnancy complicated by severe PEC. Pt reports elevated BP and headache. HA resolved with medication to be d/c on Procardia and out patient f/u.

## 2021-02-18 NOTE — H&P ADULT - HISTORY OF PRESENT ILLNESS
31 yo  pod# 4 spec s/p mg with complaitns of elevat 31 yo  pod# 4 spec s/p mg with complaints of elevated bp at home 148/100, 150/102 and headache. Pt reports having a headache since 5 pm yesterday (1 hour after being discharged) SHe reports a tension like headache was initially 8/10 and has improved to 5-6/10 after tylenol 1,000 mg, last taken 7 pm. She denies blurry vision but reports "sometimes hazy" and reports epigastric pain that feels like heartburn.     NKDA  PNV  denies med hx   hx of hernia repair as toddler   21 ft c/s for NRFHT/ arrest of dilatation c/b Spec s/p mg

## 2021-02-18 NOTE — H&P ADULT - NSHPLABSRESULTS_GEN_ALL_CORE
CBC Full  -  ( 18 Feb 2021 21:30 )  WBC Count : 8.60 K/uL  RBC Count : 3.09 M/uL  Hemoglobin : 9.0 g/dL  Hematocrit : 27.9 %  Platelet Count - Automated : 274 K/uL  Mean Cell Volume : 90.3 fL  Mean Cell Hemoglobin : 29.1 pg  Mean Cell Hemoglobin Concentration : 32.3 gm/dL  Auto Neutrophil # : 5.80 K/uL  Auto Lymphocyte # : 1.70 K/uL  Auto Monocyte # : 0.59 K/uL  Auto Eosinophil # : 0.38 K/uL  Auto Basophil # : 0.02 K/uL  Auto Neutrophil % : 67.4 %  Auto Lymphocyte % : 19.8 %  Auto Monocyte % : 6.9 %  Auto Eosinophil % : 4.4 %  Auto Basophil % : 0.2 %    02-18    139  |  102  |  8   ----------------------------<  117<H>  3.2<L>   |  25  |  0.58    Ca    8.8      18 Feb 2021 20:59    TPro  6.5  /  Alb  3.5  /  TBili  <0.2  /  DBili  x   /  AST  29  /  ALT  27  /  AlkPhos  90  02-18    ldh 266    uric acid 5.2

## 2021-02-19 ENCOUNTER — NON-APPOINTMENT (OUTPATIENT)
Age: 31
End: 2021-02-19

## 2021-02-19 PROBLEM — Z78.9 OTHER SPECIFIED HEALTH STATUS: Chronic | Status: ACTIVE | Noted: 2021-02-12

## 2021-02-20 ENCOUNTER — NON-APPOINTMENT (OUTPATIENT)
Age: 31
End: 2021-02-20

## 2021-02-22 ENCOUNTER — NON-APPOINTMENT (OUTPATIENT)
Age: 31
End: 2021-02-22

## 2021-03-02 ENCOUNTER — NON-APPOINTMENT (OUTPATIENT)
Age: 31
End: 2021-03-02

## 2021-03-08 ENCOUNTER — NON-APPOINTMENT (OUTPATIENT)
Age: 31
End: 2021-03-08

## 2021-03-22 DIAGNOSIS — R51.9 HEADACHE, UNSPECIFIED: ICD-10-CM

## 2021-03-22 DIAGNOSIS — R10.13 EPIGASTRIC PAIN: ICD-10-CM

## 2021-03-22 DIAGNOSIS — Z78.9 OTHER SPECIFIED HEALTH STATUS: ICD-10-CM

## 2021-03-22 DIAGNOSIS — O14.10 SEVERE PRE-ECLAMPSIA, UNSPECIFIED TRIMESTER: ICD-10-CM

## 2021-03-22 RX ORDER — NIFEDIPINE 30 MG/1
30 TABLET, EXTENDED RELEASE ORAL DAILY
Refills: 0 | Status: DISCONTINUED | COMMUNITY
Start: 2021-02-19 | End: 2021-03-22

## 2021-03-22 RX ORDER — ACETAMINOPHEN 325 MG/1
325 TABLET ORAL
Refills: 0 | Status: DISCONTINUED | COMMUNITY
Start: 2021-02-18 | End: 2021-03-22

## 2021-03-22 RX ORDER — LABETALOL HYDROCHLORIDE 100 MG/1
100 TABLET, FILM COATED ORAL
Refills: 0 | Status: DISCONTINUED | COMMUNITY
Start: 2021-02-19 | End: 2021-03-22

## 2021-03-23 ENCOUNTER — NON-APPOINTMENT (OUTPATIENT)
Age: 31
End: 2021-03-23

## 2021-03-23 ENCOUNTER — APPOINTMENT (OUTPATIENT)
Dept: CARDIOLOGY | Facility: CLINIC | Age: 31
End: 2021-03-23
Payer: COMMERCIAL

## 2021-03-23 VITALS
HEART RATE: 82 BPM | DIASTOLIC BLOOD PRESSURE: 90 MMHG | BODY MASS INDEX: 32.01 KG/M2 | HEIGHT: 62 IN | SYSTOLIC BLOOD PRESSURE: 128 MMHG | TEMPERATURE: 97.7 F | OXYGEN SATURATION: 98 %

## 2021-03-23 VITALS — WEIGHT: 175 LBS

## 2021-03-23 PROCEDURE — 99203 OFFICE O/P NEW LOW 30 MIN: CPT

## 2021-03-23 PROCEDURE — 99072 ADDL SUPL MATRL&STAF TM PHE: CPT

## 2021-03-23 PROCEDURE — 93000 ELECTROCARDIOGRAM COMPLETE: CPT

## 2021-03-23 NOTE — DISCUSSION/SUMMARY
[FreeTextEntry1] : 30 year old woman  delivered at 37 weeks due to PEC\par Diastolic BP is levated and consistently in the 90's\par Start Procardia 30 mg Daily\par \par

## 2021-03-23 NOTE — HISTORY OF PRESENT ILLNESS
[FreeTextEntry1] : Ms. Berumen is a 30 yr old woman admitted for induction for sPEC, \par and was started on magnesium. Patient had pLTCS on 2/14 for non reactive fetal heart tracing. She required Magnesium 24h PP, and postpartum course was uncomplicated. BPs \par remained within normal range postpartum without antihypertensive medications. \par She was briefly on Labetalol and Nifedipine\par Off everything since 1 week\par Diastolic BP is always slightly elevated.\par No history\par

## 2021-03-23 NOTE — PHYSICAL EXAM
[General Appearance - Well Developed] : well developed [Normal Appearance] : normal appearance [Well Groomed] : well groomed [General Appearance - Well Nourished] : well nourished [No Deformities] : no deformities [General Appearance - In No Acute Distress] : no acute distress [Normal Conjunctiva] : the conjunctiva exhibited no abnormalities [Eyelids - No Xanthelasma] : the eyelids demonstrated no xanthelasmas [Normal Oral Mucosa] : normal oral mucosa [No Oral Pallor] : no oral pallor [No Oral Cyanosis] : no oral cyanosis [Normal Jugular Venous A Waves Present] : normal jugular venous A waves present [Normal Jugular Venous V Waves Present] : normal jugular venous V waves present [No Jugular Venous Omer A Waves] : no jugular venous omer A waves [Heart Rate And Rhythm] : heart rate and rhythm were normal [Heart Sounds] : normal S1 and S2 [Murmurs] : no murmurs present [Respiration, Rhythm And Depth] : normal respiratory rhythm and effort [Exaggerated Use Of Accessory Muscles For Inspiration] : no accessory muscle use [Auscultation Breath Sounds / Voice Sounds] : lungs were clear to auscultation bilaterally [Abdomen Soft] : soft [Abdomen Tenderness] : non-tender [] : no hepato-splenomegaly [Abdomen Mass (___ Cm)] : no abdominal mass palpated [Abnormal Walk] : normal gait [Gait - Sufficient For Exercise Testing] : the gait was sufficient for exercise testing

## 2021-04-19 ENCOUNTER — RX CHANGE (OUTPATIENT)
Age: 31
End: 2021-04-19

## 2021-04-19 RX ORDER — NIFEDIPINE 30 MG/1
30 TABLET, FILM COATED, EXTENDED RELEASE ORAL DAILY
Qty: 90 | Refills: 0 | Status: ACTIVE | COMMUNITY
Start: 2021-03-23 | End: 1900-01-01

## 2022-01-05 NOTE — OB RN DELIVERY SUMMARY - NS_RNDELIVATTEST_OBGYN_ALL_OB
Laps, needles and instrument count was correct Closure 2 Information: This tab is for additional flaps and grafts, including complex repair and grafts and complex repair and flaps. You can also specify a different location for the additional defect, if the location is the same you do not need to select a new one. We will insert the automated text for the repair you select below just as we do for solitary flaps and grafts. Please note that at this time if you select a location with a different insurance zone you will need to override the ICD10 and CPT if appropriate.

## 2022-11-25 ENCOUNTER — LABORATORY RESULT (OUTPATIENT)
Age: 32
End: 2022-11-25

## 2022-11-25 ENCOUNTER — APPOINTMENT (OUTPATIENT)
Dept: ENDOCRINOLOGY | Facility: CLINIC | Age: 32
End: 2022-11-25

## 2022-11-25 VITALS
OXYGEN SATURATION: 99 % | HEIGHT: 62 IN | BODY MASS INDEX: 36.44 KG/M2 | TEMPERATURE: 98.8 F | WEIGHT: 198 LBS | RESPIRATION RATE: 16 BRPM | HEART RATE: 105 BPM | SYSTOLIC BLOOD PRESSURE: 108 MMHG | DIASTOLIC BLOOD PRESSURE: 70 MMHG

## 2022-11-25 DIAGNOSIS — R79.89 OTHER SPECIFIED ABNORMAL FINDINGS OF BLOOD CHEMISTRY: ICD-10-CM

## 2022-11-25 DIAGNOSIS — Z34.90 ENCOUNTER FOR SUPERVISION OF NORMAL PREGNANCY, UNSPECIFIED, UNSPECIFIED TRIMESTER: ICD-10-CM

## 2022-11-25 DIAGNOSIS — Z86.39 PERSONAL HISTORY OF OTHER ENDOCRINE, NUTRITIONAL AND METABOLIC DISEASE: ICD-10-CM

## 2022-11-25 DIAGNOSIS — Z3A.15 15 WEEKS GESTATION OF PREGNANCY: ICD-10-CM

## 2022-11-25 PROCEDURE — 99204 OFFICE O/P NEW MOD 45 MIN: CPT

## 2022-11-28 LAB
FT4I SERPL CALC-MCNC: 8.6 INDEX
T3RU NFR SERPL: 1.4 TBI
T4 FREE SERPL-MCNC: 1.1 NG/DL
T4 SERPL-MCNC: 12 UG/DL
TSH SERPL-ACNC: 0.75 UIU/ML

## 2022-11-28 NOTE — HISTORY OF PRESENT ILLNESS
[FreeTextEntry1] : 32 year F referred for management of low TSH measured with OBGYN. Currently POG 15 weeks\par \par 10/8/22: [facility]\par TSH: 0.337 (L)\par Free T4: 1.31\par \par 11/17/22:\par TSH: 0.38 (L)\par \par Prior or current medication thyroid use: No\par Known family or personal hx of thyroid disease: No\par Goiter or hx of goiter : No \par Known Hx of autoimmune disease: No\par History of Radioactive iodine therapy/ Chest or Neck radiation therapy: No\par Fatigue: No \par Weight gain without significant change in appetite: No\par Cold intolerance: No\par Depression or memory impairment: No\par Menstrual irregularities (menorrhagia), infertility: No \par Weakness, muscle cramps: No\par Constipation: No \par Hoarseness: No  \par Hypersomnolence: No\par \par Patient reports no issues with thyroid in last pregnancy. No hx to suggest thyroiditis.\par \par

## 2022-11-28 NOTE — ADDENDUM
[FreeTextEntry1] : Nov 28 2022  7:40AM\par \par  Contacted Ms. CHAIM POPE  at telephone number listed on file to review results of  TFTs done on 11/25/2022  Patient agrees to review over phone.\par \par TFTs: free T4, TSH, TT4, WNL, suggests biochemically euthyroid. Patient asymptomatic.\par \par Can c/w screening TFTs as indicated with PCP.\par \par f/up PRN \par

## 2023-05-10 ENCOUNTER — APPOINTMENT (OUTPATIENT)
Dept: ANTEPARTUM | Facility: CLINIC | Age: 33
End: 2023-05-10

## 2023-05-10 ENCOUNTER — TRANSCRIPTION ENCOUNTER (OUTPATIENT)
Age: 33
End: 2023-05-10

## 2023-05-10 ENCOUNTER — OUTPATIENT (OUTPATIENT)
Dept: OUTPATIENT SERVICES | Facility: HOSPITAL | Age: 33
LOS: 1 days | Discharge: HOME | End: 2023-05-10
Payer: COMMERCIAL

## 2023-05-10 VITALS
DIASTOLIC BLOOD PRESSURE: 79 MMHG | RESPIRATION RATE: 16 BRPM | HEART RATE: 102 BPM | TEMPERATURE: 98 F | SYSTOLIC BLOOD PRESSURE: 129 MMHG

## 2023-05-10 VITALS — DIASTOLIC BLOOD PRESSURE: 75 MMHG | HEART RATE: 93 BPM | SYSTOLIC BLOOD PRESSURE: 119 MMHG

## 2023-05-10 DIAGNOSIS — Z98.891 HISTORY OF UTERINE SCAR FROM PREVIOUS SURGERY: Chronic | ICD-10-CM

## 2023-05-10 DIAGNOSIS — Z98.890 OTHER SPECIFIED POSTPROCEDURAL STATES: Chronic | ICD-10-CM

## 2023-05-10 DIAGNOSIS — O26.899 OTHER SPECIFIED PREGNANCY RELATED CONDITIONS, UNSPECIFIED TRIMESTER: ICD-10-CM

## 2023-05-10 LAB — GLUCOSE BLDC GLUCOMTR-MCNC: 103 MG/DL — HIGH (ref 70–99)

## 2023-05-10 PROCEDURE — 99213 OFFICE O/P EST LOW 20 MIN: CPT | Mod: 25,GC

## 2023-05-10 PROCEDURE — 59025 FETAL NON-STRESS TEST: CPT | Mod: 26

## 2023-05-10 NOTE — OB PROVIDER TRIAGE NOTE - NSHPPHYSICALEXAM_GEN_ALL_CORE
VS:Vital Signs Last 24 Hrs  T(C): 36.5 (10 May 2023 15:10), Max: 36.5 (10 May 2023 14:36)  T(F): 97.7 (10 May 2023 15:10), Max: 97.7 (10 May 2023 14:36)  HR: 89 (10 May 2023 15:47) (89 - 102)  BP: 114/71 (10 May 2023 15:47) (114/71 - 129/79)  BP(mean): --  RR: 16 (10 May 2023 15:10) (16 - 16)  SpO2: --    Parameters below as of 10 May 2023 14:36  Patient On (Oxygen Delivery Method): room air      GA: NAD  Cards: RRR  Pulm: CTAB  EFH: baseline 130, moderate variability, +accels, -decels, category 1  [place]: uterine irritability  VE: 0.5/60/-3  TAUS: vertex, BPP 10/10, KARAN 21.14

## 2023-05-10 NOTE — OB PROVIDER TRIAGE NOTE - NS_CHIEFCOMPLAINT_OBGYN_ALL_OB
[facility] ([facility]    Physical Therapy  Cancellation/No-show Note  Patient Name:  Jocelyn Sanabria  :  1990   Date:  2020    Cancelled visits to date: 4   No-shows to date: 0    For today's appointment patient:  [x]  Cancelled  []  Rescheduled appointment  []  No-show     Reason given by patient:  [x]  Patient kids ill  []  Conflicting appointment  []  No transportation    []  Conflict with work  []  No reason given  []  Other:     Comments:      Phone call information:   []  Phone call made today to patient. []  Patient answered, conversation as follows:    []  Patient did not answer. []  Phone call not made today  [x]  Phone call not needed - pt contacted us to cancel and provided reason for cancellation.      Electronically signed by:  Salinas Hannah PT
Possible Labor/Headache

## 2023-05-10 NOTE — OB PROVIDER TRIAGE NOTE - NSOBPROVIDERNOTE_OBGYN_ALL_OB_FT
A&P: Pt is a 33y/o  at 39w2d who presents to triage for rule out labor, decreased fetal movement, and mild headache 5/10.   - not in labor  - BPP 10/10, KARAN 21.14  - reactive NST  - pt declining tylenol for 5/10 headache, pressures wnl  - POCT glucose 103  - discharge home with term labor precautions    d/w Dr. John Yadav PGY1

## 2023-05-10 NOTE — OB PROVIDER TRIAGE NOTE - HISTORY OF PRESENT ILLNESS
Pt is a 31y/o  at 39w2d who presents to triage for rule out labor. Pt complaining of contractions since 10 am that have been every 7 minutes. She also complains of decreased fetal movement and mild headache 5/10. She has not taken any pain medication for the headache and declines Tylenol. Denies VB, LOF,   Prenatal course complicated by gDMA1. Pt cannot remember any of her fasting or postprandial glucose values.  GBS neg      OBHx:  pLTCS for category 2 tracing and arrest of dilation at 4.5 cm. Pt was induced at 36w4d for sPEC.  GynHx: denies history of STI, denies history of abnormal pap smears, denies history of fibroids/cysts  PMHx: denies history of asthma, anxiety/depression, bleeding/clotting disorder, hypertension, diabetes.  PSHx: CS x1, h/o inguinal hernia as child.   Med: PNV, bASA, Fe  All: NKDA  SH: Patient lives at home w/  and child. Patient feels safe at home and in all her relationships. Denies use of tobacco/alcohol/drugs prior to or during pregnancy. Pt is a 33y/o  at 39w2d who presents to triage for rule out labor. Pt complaining of contractions since 10 am that have been every 7 minutes. She also complains of decreased fetal movement and mild headache 5/10. She has not taken any pain medication for the headache and declines Tylenol. Denies VB, LOF,   Prenatal course complicated by gDMA1. Pt cannot remember any of her fasting or postprandial glucose values.  GBS neg      OBHx:  pLTCS for category 2 tracing and arrest of dilation at 4.5 cm. Pt was induced at 36w4d for sPEC.  GynHx: denies history of STI, denies history of abnormal pap smears, denies history of fibroids/cysts  PMHx: h/o PPD never on meds, was in therapy but no longer. denies history of asthma,  bleeding/clotting disorder, hypertension, diabetes.  PSHx: CS x1, h/o inguinal hernia as child.   Med: PNV, bASA, Fe  All: NKDA  SH: Patient lives at home w/  and child. Patient feels safe at home and in all her relationships. Denies use of tobacco/alcohol/drugs prior to or during pregnancy.

## 2023-05-11 ENCOUNTER — ASOB RESULT (OUTPATIENT)
Age: 33
End: 2023-05-11

## 2023-05-11 ENCOUNTER — INPATIENT (INPATIENT)
Facility: HOSPITAL | Age: 33
LOS: 1 days | Discharge: HOME | End: 2023-05-13
Attending: OBSTETRICS & GYNECOLOGY | Admitting: OBSTETRICS & GYNECOLOGY
Payer: COMMERCIAL

## 2023-05-11 ENCOUNTER — APPOINTMENT (OUTPATIENT)
Dept: ANTEPARTUM | Facility: CLINIC | Age: 33
End: 2023-05-11
Payer: COMMERCIAL

## 2023-05-11 ENCOUNTER — TRANSCRIPTION ENCOUNTER (OUTPATIENT)
Age: 33
End: 2023-05-11

## 2023-05-11 VITALS
TEMPERATURE: 98 F | SYSTOLIC BLOOD PRESSURE: 120 MMHG | HEART RATE: 96 BPM | DIASTOLIC BLOOD PRESSURE: 82 MMHG | RESPIRATION RATE: 16 BRPM

## 2023-05-11 DIAGNOSIS — O24.410 GESTATIONAL DIABETES MELLITUS IN PREGNANCY, DIET CONTROLLED: ICD-10-CM

## 2023-05-11 DIAGNOSIS — O36.8130 DECREASED FETAL MOVEMENTS, THIRD TRIMESTER, NOT APPLICABLE OR UNSPECIFIED: ICD-10-CM

## 2023-05-11 DIAGNOSIS — O26.893 OTHER SPECIFIED PREGNANCY RELATED CONDITIONS, THIRD TRIMESTER: ICD-10-CM

## 2023-05-11 DIAGNOSIS — Z98.891 HISTORY OF UTERINE SCAR FROM PREVIOUS SURGERY: Chronic | ICD-10-CM

## 2023-05-11 DIAGNOSIS — Z98.890 OTHER SPECIFIED POSTPROCEDURAL STATES: Chronic | ICD-10-CM

## 2023-05-11 DIAGNOSIS — Z3A.39 39 WEEKS GESTATION OF PREGNANCY: ICD-10-CM

## 2023-05-11 DIAGNOSIS — O47.1 FALSE LABOR AT OR AFTER 37 COMPLETED WEEKS OF GESTATION: ICD-10-CM

## 2023-05-11 DIAGNOSIS — O26.899 OTHER SPECIFIED PREGNANCY RELATED CONDITIONS, UNSPECIFIED TRIMESTER: ICD-10-CM

## 2023-05-11 DIAGNOSIS — R51.9 HEADACHE, UNSPECIFIED: ICD-10-CM

## 2023-05-11 DIAGNOSIS — O34.211 MATERNAL CARE FOR LOW TRANSVERSE SCAR FROM PREVIOUS CESAREAN DELIVERY: ICD-10-CM

## 2023-05-11 LAB
ALBUMIN SERPL ELPH-MCNC: 3.5 G/DL — SIGNIFICANT CHANGE UP (ref 3.3–5)
ALP SERPL-CCNC: 100 U/L — SIGNIFICANT CHANGE UP (ref 40–120)
ALT FLD-CCNC: 6 U/L — SIGNIFICANT CHANGE UP (ref 4–33)
ANION GAP SERPL CALC-SCNC: 13 MMOL/L — SIGNIFICANT CHANGE UP (ref 7–14)
APPEARANCE UR: CLEAR — SIGNIFICANT CHANGE UP
APTT BLD: 27.2 SEC — SIGNIFICANT CHANGE UP (ref 27–36.3)
AST SERPL-CCNC: 12 U/L — SIGNIFICANT CHANGE UP (ref 4–32)
BACTERIA # UR AUTO: NEGATIVE — SIGNIFICANT CHANGE UP
BASOPHILS # BLD AUTO: 0.01 K/UL — SIGNIFICANT CHANGE UP (ref 0–0.2)
BASOPHILS NFR BLD AUTO: 0.1 % — SIGNIFICANT CHANGE UP (ref 0–2)
BILIRUB SERPL-MCNC: 0.2 MG/DL — SIGNIFICANT CHANGE UP (ref 0.2–1.2)
BILIRUB UR-MCNC: NEGATIVE — SIGNIFICANT CHANGE UP
BLD GP AB SCN SERPL QL: NEGATIVE — SIGNIFICANT CHANGE UP
BUN SERPL-MCNC: 5 MG/DL — LOW (ref 7–23)
CALCIUM SERPL-MCNC: 8.7 MG/DL — SIGNIFICANT CHANGE UP (ref 8.4–10.5)
CHLORIDE SERPL-SCNC: 104 MMOL/L — SIGNIFICANT CHANGE UP (ref 98–107)
CO2 SERPL-SCNC: 19 MMOL/L — LOW (ref 22–31)
COLOR SPEC: SIGNIFICANT CHANGE UP
COVID-19 SPIKE DOMAIN AB INTERP: POSITIVE
COVID-19 SPIKE DOMAIN ANTIBODY RESULT: >250 U/ML — HIGH
CREAT ?TM UR-MCNC: 88 MG/DL — SIGNIFICANT CHANGE UP
CREAT SERPL-MCNC: 0.5 MG/DL — SIGNIFICANT CHANGE UP (ref 0.5–1.3)
DIFF PNL FLD: ABNORMAL
EGFR: 129 ML/MIN/1.73M2 — SIGNIFICANT CHANGE UP
EOSINOPHIL # BLD AUTO: 0.04 K/UL — SIGNIFICANT CHANGE UP (ref 0–0.5)
EOSINOPHIL NFR BLD AUTO: 0.5 % — SIGNIFICANT CHANGE UP (ref 0–6)
EPI CELLS # UR: 2 /HPF — SIGNIFICANT CHANGE UP (ref 0–5)
FIBRINOGEN PPP-MCNC: 407 MG/DL — SIGNIFICANT CHANGE UP (ref 200–465)
GLUCOSE BLDC GLUCOMTR-MCNC: 89 MG/DL — SIGNIFICANT CHANGE UP (ref 70–99)
GLUCOSE SERPL-MCNC: 80 MG/DL — SIGNIFICANT CHANGE UP (ref 70–99)
GLUCOSE UR QL: NEGATIVE — SIGNIFICANT CHANGE UP
HCT VFR BLD CALC: 36.2 % — SIGNIFICANT CHANGE UP (ref 34.5–45)
HGB BLD-MCNC: 12 G/DL — SIGNIFICANT CHANGE UP (ref 11.5–15.5)
HYALINE CASTS # UR AUTO: 0 /LPF — SIGNIFICANT CHANGE UP (ref 0–7)
IANC: 6.76 K/UL — SIGNIFICANT CHANGE UP (ref 1.8–7.4)
IMM GRANULOCYTES NFR BLD AUTO: 1 % — HIGH (ref 0–0.9)
INR BLD: 1.04 RATIO — SIGNIFICANT CHANGE UP (ref 0.88–1.16)
KETONES UR-MCNC: NEGATIVE — SIGNIFICANT CHANGE UP
LDH SERPL L TO P-CCNC: 193 U/L — SIGNIFICANT CHANGE UP (ref 135–225)
LEUKOCYTE ESTERASE UR-ACNC: NEGATIVE — SIGNIFICANT CHANGE UP
LYMPHOCYTES # BLD AUTO: 1.3 K/UL — SIGNIFICANT CHANGE UP (ref 1–3.3)
LYMPHOCYTES # BLD AUTO: 14.9 % — SIGNIFICANT CHANGE UP (ref 13–44)
MCHC RBC-ENTMCNC: 29 PG — SIGNIFICANT CHANGE UP (ref 27–34)
MCHC RBC-ENTMCNC: 33.1 GM/DL — SIGNIFICANT CHANGE UP (ref 32–36)
MCV RBC AUTO: 87.4 FL — SIGNIFICANT CHANGE UP (ref 80–100)
MONOCYTES # BLD AUTO: 0.5 K/UL — SIGNIFICANT CHANGE UP (ref 0–0.9)
MONOCYTES NFR BLD AUTO: 5.7 % — SIGNIFICANT CHANGE UP (ref 2–14)
NEUTROPHILS # BLD AUTO: 6.76 K/UL — SIGNIFICANT CHANGE UP (ref 1.8–7.4)
NEUTROPHILS NFR BLD AUTO: 77.8 % — HIGH (ref 43–77)
NITRITE UR-MCNC: NEGATIVE — SIGNIFICANT CHANGE UP
NRBC # BLD: 0 /100 WBCS — SIGNIFICANT CHANGE UP (ref 0–0)
NRBC # FLD: 0 K/UL — SIGNIFICANT CHANGE UP (ref 0–0)
PH UR: 7 — SIGNIFICANT CHANGE UP (ref 5–8)
PLATELET # BLD AUTO: 220 K/UL — SIGNIFICANT CHANGE UP (ref 150–400)
POTASSIUM SERPL-MCNC: 3.8 MMOL/L — SIGNIFICANT CHANGE UP (ref 3.5–5.3)
POTASSIUM SERPL-SCNC: 3.8 MMOL/L — SIGNIFICANT CHANGE UP (ref 3.5–5.3)
PROT ?TM UR-MCNC: 9 MG/DL — SIGNIFICANT CHANGE UP
PROT ?TM UR-MCNC: 9 MG/DL — SIGNIFICANT CHANGE UP
PROT SERPL-MCNC: 6.6 G/DL — SIGNIFICANT CHANGE UP (ref 6–8.3)
PROT UR-MCNC: ABNORMAL
PROT/CREAT UR-RTO: 0.1 RATIO — SIGNIFICANT CHANGE UP (ref 0–0.2)
PROTHROM AB SERPL-ACNC: 12.1 SEC — SIGNIFICANT CHANGE UP (ref 10.5–13.4)
RBC # BLD: 4.14 M/UL — SIGNIFICANT CHANGE UP (ref 3.8–5.2)
RBC # FLD: 13.5 % — SIGNIFICANT CHANGE UP (ref 10.3–14.5)
RBC CASTS # UR COMP ASSIST: 4 /HPF — SIGNIFICANT CHANGE UP (ref 0–4)
RH IG SCN BLD-IMP: POSITIVE — SIGNIFICANT CHANGE UP
SARS-COV-2 IGG+IGM SERPL QL IA: >250 U/ML — HIGH
SARS-COV-2 IGG+IGM SERPL QL IA: POSITIVE
SODIUM SERPL-SCNC: 136 MMOL/L — SIGNIFICANT CHANGE UP (ref 135–145)
SP GR SPEC: 1.01 — SIGNIFICANT CHANGE UP (ref 1.01–1.05)
T PALLIDUM AB TITR SER: NEGATIVE — SIGNIFICANT CHANGE UP
URATE SERPL-MCNC: 4.6 MG/DL — SIGNIFICANT CHANGE UP (ref 2.5–7)
UROBILINOGEN FLD QL: SIGNIFICANT CHANGE UP
WBC # BLD: 8.7 K/UL — SIGNIFICANT CHANGE UP (ref 3.8–10.5)
WBC # FLD AUTO: 8.7 K/UL — SIGNIFICANT CHANGE UP (ref 3.8–10.5)
WBC UR QL: 2 /HPF — SIGNIFICANT CHANGE UP (ref 0–5)

## 2023-05-11 PROCEDURE — 76819 FETAL BIOPHYS PROFIL W/O NST: CPT | Mod: 26

## 2023-05-11 PROCEDURE — 99231 SBSQ HOSP IP/OBS SF/LOW 25: CPT

## 2023-05-11 PROCEDURE — 76815 OB US LIMITED FETUS(S): CPT | Mod: 26

## 2023-05-11 RX ORDER — OXYTOCIN 10 UNIT/ML
333.33 VIAL (ML) INJECTION
Qty: 20 | Refills: 0 | Status: DISCONTINUED | OUTPATIENT
Start: 2023-05-11 | End: 2023-05-13

## 2023-05-11 RX ORDER — ONDANSETRON 8 MG/1
4 TABLET, FILM COATED ORAL ONCE
Refills: 0 | Status: COMPLETED | OUTPATIENT
Start: 2023-05-11 | End: 2023-05-11

## 2023-05-11 RX ORDER — LANOLIN
1 OINTMENT (GRAM) TOPICAL EVERY 6 HOURS
Refills: 0 | Status: DISCONTINUED | OUTPATIENT
Start: 2023-05-11 | End: 2023-05-13

## 2023-05-11 RX ORDER — SODIUM CHLORIDE 9 MG/ML
1000 INJECTION, SOLUTION INTRAVENOUS ONCE
Refills: 0 | Status: DISCONTINUED | OUTPATIENT
Start: 2023-05-11 | End: 2023-05-13

## 2023-05-11 RX ORDER — ACETAMINOPHEN 500 MG
3 TABLET ORAL
Qty: 0 | Refills: 0 | DISCHARGE
Start: 2023-05-11

## 2023-05-11 RX ORDER — SODIUM CHLORIDE 9 MG/ML
1000 INJECTION, SOLUTION INTRAVENOUS
Refills: 0 | Status: DISCONTINUED | OUTPATIENT
Start: 2023-05-11 | End: 2023-05-13

## 2023-05-11 RX ORDER — IBUPROFEN 200 MG
600 TABLET ORAL EVERY 6 HOURS
Refills: 0 | Status: COMPLETED | OUTPATIENT
Start: 2023-05-11 | End: 2024-04-08

## 2023-05-11 RX ORDER — HEPARIN SODIUM 5000 [USP'U]/ML
5000 INJECTION INTRAVENOUS; SUBCUTANEOUS EVERY 12 HOURS
Refills: 0 | Status: DISCONTINUED | OUTPATIENT
Start: 2023-05-11 | End: 2023-05-13

## 2023-05-11 RX ORDER — OXYCODONE HYDROCHLORIDE 5 MG/1
5 TABLET ORAL
Refills: 0 | Status: COMPLETED | OUTPATIENT
Start: 2023-05-11 | End: 2023-05-18

## 2023-05-11 RX ORDER — SODIUM CHLORIDE 9 MG/ML
1000 INJECTION, SOLUTION INTRAVENOUS
Refills: 0 | Status: DISCONTINUED | OUTPATIENT
Start: 2023-05-11 | End: 2023-05-11

## 2023-05-11 RX ORDER — SIMETHICONE 80 MG/1
80 TABLET, CHEWABLE ORAL EVERY 4 HOURS
Refills: 0 | Status: DISCONTINUED | OUTPATIENT
Start: 2023-05-11 | End: 2023-05-13

## 2023-05-11 RX ORDER — OXYTOCIN 10 UNIT/ML
333.33 VIAL (ML) INJECTION
Qty: 20 | Refills: 0 | Status: DISCONTINUED | OUTPATIENT
Start: 2023-05-11 | End: 2023-05-11

## 2023-05-11 RX ORDER — KETOROLAC TROMETHAMINE 30 MG/ML
30 SYRINGE (ML) INJECTION EVERY 6 HOURS
Refills: 0 | Status: DISCONTINUED | OUTPATIENT
Start: 2023-05-11 | End: 2023-05-12

## 2023-05-11 RX ORDER — SODIUM CHLORIDE 9 MG/ML
1000 INJECTION INTRAMUSCULAR; INTRAVENOUS; SUBCUTANEOUS
Refills: 0 | Status: DISCONTINUED | OUTPATIENT
Start: 2023-05-11 | End: 2023-05-11

## 2023-05-11 RX ORDER — OXYCODONE HYDROCHLORIDE 5 MG/1
1 TABLET ORAL
Qty: 6 | Refills: 0
Start: 2023-05-11 | End: 2023-05-12

## 2023-05-11 RX ORDER — ACETAMINOPHEN 500 MG
975 TABLET ORAL
Refills: 0 | Status: DISCONTINUED | OUTPATIENT
Start: 2023-05-11 | End: 2023-05-13

## 2023-05-11 RX ORDER — FAMOTIDINE 10 MG/ML
20 INJECTION INTRAVENOUS ONCE
Refills: 0 | Status: DISCONTINUED | OUTPATIENT
Start: 2023-05-11 | End: 2023-05-11

## 2023-05-11 RX ORDER — CITRIC ACID/SODIUM CITRATE 300-500 MG
30 SOLUTION, ORAL ORAL ONCE
Refills: 0 | Status: DISCONTINUED | OUTPATIENT
Start: 2023-05-11 | End: 2023-05-11

## 2023-05-11 RX ORDER — MAGNESIUM HYDROXIDE 400 MG/1
30 TABLET, CHEWABLE ORAL
Refills: 0 | Status: DISCONTINUED | OUTPATIENT
Start: 2023-05-11 | End: 2023-05-13

## 2023-05-11 RX ORDER — OXYCODONE HYDROCHLORIDE 5 MG/1
5 TABLET ORAL ONCE
Refills: 0 | Status: DISCONTINUED | OUTPATIENT
Start: 2023-05-11 | End: 2023-05-13

## 2023-05-11 RX ORDER — DIPHENHYDRAMINE HCL 50 MG
25 CAPSULE ORAL EVERY 6 HOURS
Refills: 0 | Status: DISCONTINUED | OUTPATIENT
Start: 2023-05-11 | End: 2023-05-13

## 2023-05-11 RX ORDER — TETANUS TOXOID, REDUCED DIPHTHERIA TOXOID AND ACELLULAR PERTUSSIS VACCINE, ADSORBED 5; 2.5; 8; 8; 2.5 [IU]/.5ML; [IU]/.5ML; UG/.5ML; UG/.5ML; UG/.5ML
0.5 SUSPENSION INTRAMUSCULAR ONCE
Refills: 0 | Status: COMPLETED | OUTPATIENT
Start: 2023-05-11

## 2023-05-11 RX ORDER — IBUPROFEN 200 MG
1 TABLET ORAL
Qty: 0 | Refills: 0 | DISCHARGE
Start: 2023-05-11

## 2023-05-11 RX ADMIN — ONDANSETRON 4 MILLIGRAM(S): 8 TABLET, FILM COATED ORAL at 21:30

## 2023-05-11 RX ADMIN — Medication 975 MILLIGRAM(S): at 21:10

## 2023-05-11 RX ADMIN — HEPARIN SODIUM 5000 UNIT(S): 5000 INJECTION INTRAVENOUS; SUBCUTANEOUS at 20:37

## 2023-05-11 RX ADMIN — Medication 975 MILLIGRAM(S): at 20:37

## 2023-05-11 RX ADMIN — SODIUM CHLORIDE 125 MILLILITER(S): 9 INJECTION, SOLUTION INTRAVENOUS at 11:22

## 2023-05-11 NOTE — DISCHARGE NOTE OB - PLAN OF CARE
After discharge, please stay on pelvic rest for 6 weeks, meaning no sexual intercourse, no tampons and no douching.  No driving for 2 weeks as women can loose a lot of blood during delivery and there is a possibility of being lightheaded/fainting.  No lifting objects heavier than baby for two weeks.  Expect to have vaginal bleeding/spotting for up to six weeks.  The bleeding should get lighter and more white/light brown with time.  For bleeding soaking more than a pad an hour or passing clots greater than the size of your fist, come in to the emergency department.    You can take up to 600 mg Ibuprofen every 6 hours and/or 975 mg Tylenol every 6 hours for pain. You can take Oxycodone as needed for severe pain.    Follow up with your Ob/Gyn in 2 weeks for incision check.  Call clinic for noticeable increase in redness or swelling at incision, discharge from incision, or opening of skin at incision site. Please follow-up with your Ob/Gyn in 6 weeks for an oral glucose tolerance test. You do not need to check your finger stick blood sugars at home in the meantime. Please follow-up with your Ob/Gyn in 6 weeks for an oral glucose tolerance test. You do not need to check your finger stick blood sugars at home.

## 2023-05-11 NOTE — OB RN DELIVERY SUMMARY - NS_SEPSISRSKCALC_OBGYN_ALL_OB_FT
EOS calculated successfully. EOS Risk Factor: 0.5/1000 live births ([facility] national incidence); GA=39w3d; Temp=98.42; ROM=0.017; GBS='Negative'; Antibiotics='No antibiotics or any antibiotics < 2 hrs prior to birth'

## 2023-05-11 NOTE — OB PROVIDER TRIAGE NOTE - NSHPPHYSICALEXAM_GEN_ALL_CORE
T(C): 36.9 (05-11-23 @ 07:52), Max: 36.9 (05-11-23 @ 07:35)  HR: 84 (05-11-23 @ 09:26) (81 - 102)  BP: 106/67 (05-11-23 @ 09:26) (106/67 - 129/79)  RR: 18 (05-11-23 @ 07:52) (16 - 18)  SpO2: --  General: Female sitting comfortably between ctx. Breathing through ctx.   Head: Normocephalic. Atraumatic.   Eyes: No discharge, lids normal, conjunctiva normal  Lungs: No resp distress  Abdomen: Soft, nontender. Gravid. No pain at previous incision site. No guarding, rebound, rigidity.   TAUS:  Sono saved in ASOB.   Neuro: No facial asymmetry, no slurred speech, moves all 4 extremities  Mood: Alert and lucid, appropriate mood and affect

## 2023-05-11 NOTE — OB RN TRIAGE NOTE - NS_OBGYNHISTORY_OBGYN_ALL_OB_FT
2/14/21/primary csection for failure to dialte 6-1 2/14/21/primary csection for failure to dialte 6-1, PEC at 36 and 6, IOL for NRFHR

## 2023-05-11 NOTE — OB RN INTRAOPERATIVE NOTE - NS_ANESTHESIATYPE_OBGYN_ALL_OB
H&P- Sandra Doty 1939, 70 y o  female MRN: [id number]    Unit/Bed#: -01 Encounter: [id number]    Primary Care Provider: Trevin Pinto MD   Date and time admitted to hospital: 7/6/2018  7:01 PM        * Acute CHF (congestive heart failure) ([facility] [address] )   Assessment & Plan    Admit telemetry  Trend troponin  Cardiology consult  Echo pending  Lasix 60 mg IV b i d   I&O  Daily weights        Acute kidney injury ([facility] [address] )   Assessment & Plan    Hold Diovan and HCTZ secondary to acute kidney injury  Recheck BMP  NSTEMI (non-ST elevated myocardial infarction) [facility])   Assessment & Plan    Monitor on telemetry  Trend troponin  Cardiology consult pending        Hypertension   Assessment & Plan    Continue metoprolol              VTE Prophylaxis: Heparin  / sequential compression device   Code Status:  Full  POLST: There is no POLST form on file for this patient (pre-hospital)  Discussion with family:  Family is present for the entire discussion  Anticipated Length of Stay:  Patient will be admitted on an Inpatient basis with an anticipated length of stay of  more than 2 midnights  Justification for Hospital Stay:  Diuresis    Total Time for Visit, including Counseling / Coordination of Care: 30 minutes  Greater than 50% of this total time spent on direct patient counseling and coordination of care  Chief Complaint:   Shortness of breath     History of Present Illness:    Sandra Doty is a 70 y o  female who presents with complaints of shortness of breath  Patient states that she has a history of occasional shortness of breath but that a increased today  Patient states that she has significant increased shortness of breath with exertional activities  Patient denies chest pain, fever, nausea, vomiting, abdominal pain       Review of Systems:    Review of Systems   Respiratory: Positive for shortness of breath  All other systems reviewed and are negative        Past Medical and Surgical History: Past Medical History:   Diagnosis Date    Anemia     Arthropathy     Benign neoplasm of sigmoid colon     Hyperlipidemia     Hypertension     Lower extremity edema     Nontoxic goiter     Obesity     Paroxysmal supraventricular tachycardia (HCC)     Tachycardia     Thrombocytopenia (HCC)     Thyromegaly     Uterine leiomyoma        Past Surgical History:   Procedure Laterality Date    BACK SURGERY      CATARACT EXTRACTION      DILATION AND CURETTAGE OF UTERUS      HIP SURGERY      HYSTERECTOMY      total abdominal hysterectomy with removal of both ovaries    REVISION TOTAL KNEE ARTHROPLASTY      TOTAL KNEE ARTHROPLASTY         Meds/Allergies:    Prior to Admission medications    Medication Sig Start Date End Date Taking?  Authorizing Provider   aspirin (ECOTRIN LOW STRENGTH) 81 mg EC tablet Take 81 mg by mouth daily   Yes Historical Provider, MD   atorvastatin (LIPITOR) 20 mg tablet Take 1 tablet (20 mg total) by mouth daily  Patient taking differently: Take 40 mg by mouth daily   3/22/18  Yes Eugenia Harris PA-C   calcium citrate-vitamin D (CITRACAL+D) 315-200 MG-UNIT per tablet Take 1 tablet by mouth daily   Yes Historical Provider, MD   cholecalciferol (VITAMIN D3) 1,000 units tablet Take 1,000 Units by mouth daily   Yes Historical Provider, MD   furosemide (LASIX) 40 mg tablet Take 1 tablet (40 mg total) by mouth daily 4/24/18  Yes Primo Pulliam MD   meloxicam (MOBIC) 7 5 mg tablet TAKE 1 TABLET DAILY 6/25/18  Yes Eugenia Harris PA-C   metoprolol succinate (TOPROL-XL) 100 mg 24 hr tablet Take 1 tablet (100 mg total) by mouth daily 5/9/18  Yes Eugenia Harris PA-C   Multiple Vitamin (MULTIVITAMIN) tablet Take 1 tablet by mouth daily   Yes Historical Provider, MD   Omega-3 Fatty Acids (FISH OIL) 1,000 mg Take 1,000 mg by mouth 2 (two) times a day   Yes Historical Provider, MD   valsartan-hydrochlorothiazide (DIOVAN HCT) 80-12 5 MG per tablet Take 1 tablet by mouth daily   Yes Historical Provider, MD hydrocortisone 2 5 % cream Apply topically 4 (four) times a day as needed (itching) 9/11/17 7/6/18  Lamar Pruitt MD     I have reviewed home medications with patient personally  Allergies: No Known Allergies    Social History:     Marital Status:    Occupation:  Retired  Patient Pre-hospital Living Situation:  Lives at home  Patient Pre-hospital Level of Mobility:  Ambulatory with cane  Patient Pre-hospital Diet Restrictions:  None  Substance Use History:   History   Alcohol Use    Yes     Comment: social alcohol use (as per allscripts)     History   Smoking Status    Never Smoker   Smokeless Tobacco    Never Used     History   Drug Use No       Family History:    Family History   Problem Relation Age of Onset    Heart failure Mother         congestive    Kidney disease Family         chronic,stage    Coronary artery disease Family        Physical Exam:     Vitals:   Blood Pressure: 112/66 (07/06/18 2138)  Pulse: 95 (07/06/18 2138)  Temperature: 97 5 °F (36 4 °C) (07/06/18 2138)  Temp Source: Oral (07/06/18 2138)  Respirations: 18 (07/06/18 2138)  Height: 5' 6" (167 6 cm) (07/06/18 2138)  Weight - Scale: 126 kg (277 lb 12 5 oz) (07/06/18 2138)  SpO2: 99 % (07/06/18 2138)    Physical Exam   Constitutional: She is oriented to person, place, and time  She appears well-developed and well-nourished  HENT:   Head: Normocephalic and atraumatic  Right Ear: External ear normal    Left Ear: External ear normal    Nose: Nose normal    Mouth/Throat: Oropharynx is clear and moist    Eyes: Conjunctivae and EOM are normal  Pupils are equal, round, and reactive to light  Neck: Normal range of motion  Cardiovascular: Normal rate, regular rhythm and normal heart sounds  Pulmonary/Chest: Effort normal  She has decreased breath sounds  Abdominal: Soft  Bowel sounds are normal    Musculoskeletal: She exhibits edema  Neurological: She is alert and oriented to person, place, and time  Skin: Skin is warm and dry  Psychiatric: She has a normal mood and affect  Her behavior is normal  Judgment and thought content normal    Vitals reviewed  Additional Data:     Lab Results: I have personally reviewed pertinent reports  Results from last 7 days  Lab Units 07/06/18 1919   SODIUM mmol/L 138   POTASSIUM mmol/L 3 9   CHLORIDE mmol/L 102   CO2 mmol/L 24   BUN mg/dL 47*   CREATININE mg/dL 1 81*   CALCIUM mg/dL 9 0   TOTAL PROTEIN g/dL 7 9   BILIRUBIN TOTAL mg/dL 0 60   ALK PHOS U/L 103   ALT U/L 32   AST U/L 29   GLUCOSE RANDOM mg/dL 101       Results from last 7 days  Lab Units 07/06/18 1919   INR  1 07               Imaging: I have personally reviewed pertinent reports  XR chest pa & lateral    (Results Pending)       EKG, Pathology, and Other Studies Reviewed on Admission:   · EKG:     Allscripts / Epic Records Reviewed: Yes     ** Please Note: This note has been constructed using a voice recognition system   ** Spinal

## 2023-05-11 NOTE — OB RN TRIAGE NOTE - FALL HARM RISK - UNIVERSAL INTERVENTIONS
Bed in lowest position, wheels locked, appropriate side rails in place/Call bell, personal items and telephone in reach/Instruct patient to call for assistance before getting out of bed or chair/Non-slip footwear when patient is out of bed/[place] to call system/Physically safe environment - no spills, clutter or unnecessary equipment/Purposeful Proactive Rounding/Room/bathroom lighting operational, light cord in reach

## 2023-05-11 NOTE — OB PROVIDER TRIAGE NOTE - NS_OBGYNHISTORY_OBGYN_ALL_OB_FT
2/14/21/primary csection for failure to dialte 6-1, PEC at 36 and 6, IOL for NRFHR 2/14/21 primary csection for NRFHR, was an induction of labor for sPEC @ 36w, notes labored for 2-3 days, failure to dilate @ 4-5cm.

## 2023-05-11 NOTE — OB RN DELIVERY SUMMARY - NSSELHIDDEN_OBGYN_ALL_OB_FT
[NS_DeliveryAttending1_OBGYN_ALL_OB_FT:[id number]],[NS_DeliveryAssist1_OBGYN_ALL_OB_FT:[id number]],[NS_DeliveryAssist2_OBGYN_ALL_OB_FT:[id number]],[NS_DeliveryRN_OBGYN_ALL_OB_FT:[id number]]

## 2023-05-11 NOTE — DISCHARGE NOTE OB - CARE PROVIDER_API CALL
Chrissy Crowder)  Obstetrics and Gynecology  [address], [address]  [place] [address]  Phone: [phone]  Fax: [phone]  Follow Up Time: 2 weeks

## 2023-05-11 NOTE — OB PROVIDER H&P - ASSESSMENT
Ms. CHAIM POPE is a 32y   @ 39w3d p/w ctx q 5-7 minutes, 20/10 pain with evidence of latent labor w uncomfortable ctx and labile BPs at this time. NPO @ 8pm yday.     - Pt discussed @ safety rounds. Dr. Dao, Dr. Flores, PGY4 present  - Huddle for unscheduled c/s initiated at 09:35. Dr. Dao, Dr. Flores, PGY4, Dr. Oliveira, and Nery RN present    Plan  - Admit to PACU for c/s due to latent labor @ 39w3 w uncomfortable ctx  - Pt counseled at length regarding option of TOLAC, presented with paper consents and pt is refusing TOLAC at this time and prefers c/s   - Continuous EFM  - NPO   - Consent signed  - Preeclamptic labs  - Y tubing   - 2 units on hold, hx of c/s x 1    - Standard labs (T&S, CBC, RPR, covid serology)  - Consider re-examination in 4 hours     Informed consent was obtained. The following was discussed:  - Possible  section  - TOLAC     Risks, benefits, alternatives, and possible complications have been discussed in detail with the patient in her native language. Pre-admission, admission, and post admission procedures and expectations were discussed in detail. All questions answered, all appropriate hospital consents were signed.     Plan d/w Dr. Flores, PGY3, Dr. Dao

## 2023-05-11 NOTE — DISCHARGE NOTE OB - CARE PLAN
1 Principal Discharge DX:	Status post repeat low transverse  section  Assessment and plan of treatment:	After discharge, please stay on pelvic rest for 6 weeks, meaning no sexual intercourse, no tampons and no douching.  No driving for 2 weeks as women can loose a lot of blood during delivery and there is a possibility of being lightheaded/fainting.  No lifting objects heavier than baby for two weeks.  Expect to have vaginal bleeding/spotting for up to six weeks.  The bleeding should get lighter and more white/light brown with time.  For bleeding soaking more than a pad an hour or passing clots greater than the size of your fist, come in to the emergency department.    You can take up to 600 mg Ibuprofen every 6 hours and/or 975 mg Tylenol every 6 hours for pain. You can take Oxycodone as needed for severe pain.    Follow up with your Ob/Gyn in 2 weeks for incision check.  Call clinic for noticeable increase in redness or swelling at incision, discharge from incision, or opening of skin at incision site.  Secondary Diagnosis:	Gestational diabetes  Assessment and plan of treatment:	Please follow-up with your Ob/Gyn in 6 weeks for an oral glucose tolerance test. You do not need to check your finger stick blood sugars at home in the meantime.   Principal Discharge DX:	Status post repeat low transverse  section  Assessment and plan of treatment:	After discharge, please stay on pelvic rest for 6 weeks, meaning no sexual intercourse, no tampons and no douching.  No driving for 2 weeks as women can loose a lot of blood during delivery and there is a possibility of being lightheaded/fainting.  No lifting objects heavier than baby for two weeks.  Expect to have vaginal bleeding/spotting for up to six weeks.  The bleeding should get lighter and more white/light brown with time.  For bleeding soaking more than a pad an hour or passing clots greater than the size of your fist, come in to the emergency department.    You can take up to 600 mg Ibuprofen every 6 hours and/or 975 mg Tylenol every 6 hours for pain. You can take Oxycodone as needed for severe pain.    Follow up with your Ob/Gyn in 2 weeks for incision check.  Call clinic for noticeable increase in redness or swelling at incision, discharge from incision, or opening of skin at incision site.  Secondary Diagnosis:	Gestational diabetes  Assessment and plan of treatment:	Please follow-up with your Ob/Gyn in 6 weeks for an oral glucose tolerance test. You do not need to check your finger stick blood sugars at home.

## 2023-05-11 NOTE — DISCHARGE NOTE OB - PATIENT PORTAL LINK FT
You can access the FollowMyHealth Patient Portal offered by [facility] by registering at the following website: [email or web address]. By joining [facility]’s FollowMyHealth portal, you will also be able to view your health information using other applications (apps) compatible with our system.

## 2023-05-11 NOTE — OB RN INTRAOPERATIVE NOTE - NSSELHIDDEN_OBGYN_ALL_OB_FT
[NS_DeliveryAttending1_OBGYN_ALL_OB_FT:[id number]],[NS_DeliveryAssist1_OBGYN_ALL_OB_FT:[id number]],[NS_DeliveryAssist2_OBGYN_ALL_OB_FT:[id number]],[NS_DeliveryRN_OBGYN_ALL_OB_FT:[id number]]

## 2023-05-11 NOTE — OB PROVIDER DELIVERY SUMMARY - NSPROVIDERDELIVERYNOTE_OBGYN_ALL_OB_FT
repeat LTCS, uncomplicated  viable female infant, vertex presentation, 3540g, Apgars 9/9, cord gasses sent  Grossly normal fallopian tubes, uterus, and ovaries    EBL: 549  IVF: 1200  UOP: 100    Dictation #: [id number]    Elisabeth Gómez, PGY2

## 2023-05-11 NOTE — OB PROVIDER TRIAGE NOTE - HISTORY OF PRESENT ILLNESS
Ms. CHAIM POPE is a 32y   @ 39w3d p/w ctx q 5-7 minutes, 20/10     PNC: Denies prenatal issues or complications. Pt reports no hospitalizations, procedures, infections, or new diagnoses in pregnancy. Pt denies complications with blood pressure and/or blood sugar. LMP Ms. CHAIM POPE is a 32y   @ 39w3d p/w ctx q 5-7 minutes, 20/10 pain. + spotting, passed mucous plug. + FM. Seen yday, fingertip, latent labor, tolerating ctx, discharged home.   PNC: [facility]. Last seen last week, next appointment tomorrow. No scheduled c/s date at this time, instructed to go to [facility] on  for c/s. Pt notes was not consented on TOLAC at office, without consents. GDMA1.

## 2023-05-11 NOTE — DISCHARGE NOTE OB - NS MD DC FALL RISK RISK
For information on Fall & Injury Prevention, visit: [email or web address] OR  [email or web address] OR  https://www.cdc.gov/steadi/patient.html

## 2023-05-11 NOTE — DISCHARGE NOTE OB - HOSPITAL COURSE
Patient presented in early labor and had an uncomplicated, repeat low transverse  section.  Please see operative note for details.  During postpartum course patient's vitals were stable, vaginal bleeding appropriate, and pain well controlled.  Post operation day one hematocrit was appropriate.  On day of discharge patient was ambulating, her pain controlled with oral medications, had adequate oral intake, and was voiding freely.  Discharge instructions and precautions were given.  Will return to her Ob/Gyn in 2 weeks for incision check.  Postpartum birth control plan is ____.   Patient presented in early labor and had an uncomplicated, repeat low transverse  section.  Please see operative note for details.  During postpartum course patient's vitals were stable, vaginal bleeding appropriate, and pain well controlled.  Post operation day one hematocrit was appropriate.  On day of discharge patient was ambulating, her pain controlled with oral medications, had adequate oral intake, and was voiding freely.  Discharge instructions and precautions were given.  Will return to her Ob/Gyn in 2 weeks for incision check. Declines postpartum birth control.

## 2023-05-11 NOTE — DISCHARGE NOTE OB - MEDICATION SUMMARY - MEDICATIONS TO TAKE
I will START or STAY ON the medications listed below when I get home from the hospital:    ibuprofen 600 mg oral tablet  -- 1 tab(s) by mouth every 6 hours  -- Indication: For Pain    acetaminophen 325 mg oral tablet  -- 3 tab(s) by mouth every 6 hours  -- Indication: For Pain    oxyCODONE 5 mg oral tablet  -- 1 tab(s) by mouth every 8 hours as needed for  severe pain MDD: 3  -- Indication: For Severe pain    PNV Prenatal oral tablet  -- 1 by mouth once a day  -- Indication: For Home medication

## 2023-05-11 NOTE — OB PROVIDER H&P - HISTORY OF PRESENT ILLNESS
Ms. CHAIM POPE is a 32y   @ 39w3d p/w ctx q 5-7 minutes, 20/10 pain. + spotting, passed mucous plug. + FM. Seen yday, fingertip, latent labor, tolerating ctx, discharged home.   PNC: [facility]. Last seen last week, next appointment tomorrow. No scheduled c/s date at this time, instructed to go to [facility] on  for c/s. Pt notes was not consented on TOLAC at office, without consents. GDMA1.

## 2023-05-11 NOTE — OB PROVIDER TRIAGE NOTE - NSOBPROVIDERNOTE_OBGYN_ALL_OB_FT
Ms. CHAIM POPE is a 32y   @ 39w3d p/w ctx q 5-7 minutes, 20/10 pain with evidence of latent labor and labile BPs at this time.     Plan  Plan  - Admit to Labor and Delivery  - Continuous EFM  - Clear diet, IV fluids  - Consent signed  - Standard labs (T&S, CBC, RPR, covid serology)  - Epidural PRN, cleared by Dr.   - Induction/augmentation agent:   - Consider re-examination in 4 hours     Informed consent was obtained. The following was discussed:  - Induction/augmentation of labor: use of medication and/or cook balloon to begin or enhance labor  - Obstetrical management including internal fetal/contraction monitoring  - Normal vaginal delivery  - Possible  section    Risks, benefits, alternatives, and possible complications have been discussed in detail with the patient in her native language. Pre-admission, admission, and post admission procedures and expectations were discussed in detail. All questions answered, all appropriate hospital consents were signed. Anticipate normal vaginal delivery.    Plan d/w Dr. Flores, PGY3, Dr. Dao Ms. CHAIM POPE is a 32y   @ 39w3d p/w ctx q 5-7 minutes, 20/10 pain with evidence of latent labor and labile BPs at this time.     - Pt discussed @ safety rounds. Dr. Dao, Dr. Flores, PGY4 present  - Huddle initated at 09:35. Dr. Dao, Dr. Flores, PGY4, Dr. Oliveira, and Nery RN     Plan  - Admit to PACU for c/s due to latent labor @ 39w3 w uncomfortable ctx  - Pt counseled at length regarding option of TOLAC, presented with paper consents and pt is refusing TOLAC at this time and prefers to c/s   - Continuous EFM  - NPO   - Consent signed  - Preeclamptic labs   - Standard labs (T&S, CBC, RPR, covid serology)  - Consider re-examination in 4 hours     Informed consent was obtained. The following was discussed:  - Possible  section  - TOLAC     Risks, benefits, alternatives, and possible complications have been discussed in detail with the patient in her native language. Pre-admission, admission, and post admission procedures and expectations were discussed in detail. All questions answered, all appropriate hospital consents were signed.     Plan d/w Dr. Flores, PGY3, Dr. Dao

## 2023-05-11 NOTE — DISCHARGE NOTE OB - CALL YOUR HEALTHCARE PROVIDER IF YOU ARE EXPERIENCING SYMPTOMS OF DEPRESSION THAT LAST MORE THAN THREE DAYS
Medication(s) Requested: Zyrtec  Last office visit: 1/6/21  Last refill: 4/13/2020  Is the patient due for refill of this medication(s): Yes but overdue for CPE, last CPE 7/25/19.  M for pt to call back and schedule CPE then we can send in refill.      Statement Selected

## 2023-05-11 NOTE — OB RN TRIAGE NOTE - SUICIDE SCREENING QUESTION 3
Post-Care Instructions: I reviewed with the patient in detail post-care instructions. Patient is to wear sunprotection, and avoid picking at any of the treated lesions. Pt may apply Vaseline to crusted or scabbing areas. Show Spray Paint Technique Variable?: Yes Detail Level: Detailed Spray Paint Text: The liquid nitrogen was applied to the skin utilizing a spray paint frosting technique. Add 52 Modifier (Optional): no Consent: The patient's consent was obtained including but not limited to risks of crusting, scabbing, blistering, scarring, darker or lighter pigmentary change, recurrence, incomplete removal and infection. Medical Necessity Information: It is in your best interest to select a reason for this procedure from the list below. All of these items fulfill various CMS LCD requirements except the new and changing color options. Medical Necessity Clause: This procedure was medically necessary because the lesions that were treated were: No

## 2023-05-11 NOTE — OB PROVIDER H&P - NS_OBGYNHISTORY_OBGYN_ALL_OB_FT
2/14/21 primary csection for NRFHR, was an induction of labor for sPEC @ 36w, notes labored for 2-3 days, failure to dilate @ 4-5cm.

## 2023-05-11 NOTE — OB PROVIDER DELIVERY SUMMARY - NS_DELIVERYATTENDING1_OBGYN_ALL_OB_FT
INSURANCE COVERAGE REGARDING PAYMENT  FOR YOUR COLONOSCOPY        Colon Cancer is the second leading cause of death among cancers, per the American Cancer Society. It is preventable. Early detection is the key. Your doctor will determine which tests need to be done for prevention and/or treatment. However, colonoscopies can be performed for several reasons:     Screening To screen for any problems within the colon. In this case, the patient is not symptomatic and does not have a personal history of previous colon cancer/condition or abnormal findings. Billed as screening.   Surveillance To monitor for a previously diagnosed colon condition (such as polyps) or when performed at more frequent intervals than every ten years because the patient has a personal/family history of colonic polyps or colon cancer. Billed as diagnostic.   Diagnostic Patient with symptoms, used to evaluate the colon. Billed as diagnostic.       If during a screening colonoscopy, our physician finds an abnormality, performs a biopsy or polypectomy (removal of polyp), your insurance company may consider the procedure to be a diagnostic exam and no longer a screening procedure.     Every insurance company is different. We encourage you to call your insurance company regarding plan benefits. Generally, screening benefits and diagnostic benefits may be paid at different levels. Charges associated with anesthesia or type of facility may also be processed differently. This varies with each insurance company, so we want you to be aware of this prior to your procedure. You do not have to call your insurance company if you have Medicare. For an estimate of potential charges, you may call the [facility] Patient Contact Center at [phone], option 5.     The authorization staff at [facility] will contact your insurance company to check precertification requirements for your colonoscopy. However, precertification, which serves as notification  Veronica Dao MD is never a guarantee of payment. If you have questions regarding precertification for your procedure, please contact your insurance company.

## 2023-05-11 NOTE — OB PROVIDER DELIVERY SUMMARY - NSSELHIDDEN_OBGYN_ALL_OB_FT
[NS_DeliveryAttending1_OBGYN_ALL_OB_FT:[id number]],[NS_DeliveryAssist1_OBGYN_ALL_OB_FT:[id number]],[NS_DeliveryAssist2_OBGYN_ALL_OB_FT:[id number]],[NS_DeliveryRN_OBGYN_ALL_OB_FT:[id number]]

## 2023-05-12 LAB
BASOPHILS # BLD AUTO: 0.01 K/UL — SIGNIFICANT CHANGE UP (ref 0–0.2)
BASOPHILS NFR BLD AUTO: 0.1 % — SIGNIFICANT CHANGE UP (ref 0–2)
EOSINOPHIL # BLD AUTO: 0.06 K/UL — SIGNIFICANT CHANGE UP (ref 0–0.5)
EOSINOPHIL NFR BLD AUTO: 0.6 % — SIGNIFICANT CHANGE UP (ref 0–6)
HCT VFR BLD CALC: 28.6 % — LOW (ref 34.5–45)
HGB BLD-MCNC: 9.4 G/DL — LOW (ref 11.5–15.5)
IANC: 6.95 K/UL — SIGNIFICANT CHANGE UP (ref 1.8–7.4)
IMM GRANULOCYTES NFR BLD AUTO: 0.6 % — SIGNIFICANT CHANGE UP (ref 0–0.9)
LYMPHOCYTES # BLD AUTO: 1.86 K/UL — SIGNIFICANT CHANGE UP (ref 1–3.3)
LYMPHOCYTES # BLD AUTO: 19.2 % — SIGNIFICANT CHANGE UP (ref 13–44)
MCHC RBC-ENTMCNC: 28.6 PG — SIGNIFICANT CHANGE UP (ref 27–34)
MCHC RBC-ENTMCNC: 32.9 GM/DL — SIGNIFICANT CHANGE UP (ref 32–36)
MCV RBC AUTO: 86.9 FL — SIGNIFICANT CHANGE UP (ref 80–100)
MONOCYTES # BLD AUTO: 0.75 K/UL — SIGNIFICANT CHANGE UP (ref 0–0.9)
MONOCYTES NFR BLD AUTO: 7.7 % — SIGNIFICANT CHANGE UP (ref 2–14)
NEUTROPHILS # BLD AUTO: 6.95 K/UL — SIGNIFICANT CHANGE UP (ref 1.8–7.4)
NEUTROPHILS NFR BLD AUTO: 71.8 % — SIGNIFICANT CHANGE UP (ref 43–77)
NRBC # BLD: 0 /100 WBCS — SIGNIFICANT CHANGE UP (ref 0–0)
NRBC # FLD: 0 K/UL — SIGNIFICANT CHANGE UP (ref 0–0)
PLATELET # BLD AUTO: 173 K/UL — SIGNIFICANT CHANGE UP (ref 150–400)
RBC # BLD: 3.29 M/UL — LOW (ref 3.8–5.2)
RBC # FLD: 13.5 % — SIGNIFICANT CHANGE UP (ref 10.3–14.5)
WBC # BLD: 9.69 K/UL — SIGNIFICANT CHANGE UP (ref 3.8–10.5)
WBC # FLD AUTO: 9.69 K/UL — SIGNIFICANT CHANGE UP (ref 3.8–10.5)

## 2023-05-12 RX ORDER — OXYCODONE HYDROCHLORIDE 5 MG/1
5 TABLET ORAL
Refills: 0 | Status: DISCONTINUED | OUTPATIENT
Start: 2023-05-12 | End: 2023-05-13

## 2023-05-12 RX ORDER — IBUPROFEN 200 MG
600 TABLET ORAL EVERY 6 HOURS
Refills: 0 | Status: DISCONTINUED | OUTPATIENT
Start: 2023-05-12 | End: 2023-05-13

## 2023-05-12 RX ADMIN — Medication 975 MILLIGRAM(S): at 03:25

## 2023-05-12 RX ADMIN — Medication 30 MILLIGRAM(S): at 01:39

## 2023-05-12 RX ADMIN — OXYCODONE HYDROCHLORIDE 5 MILLIGRAM(S): 5 TABLET ORAL at 14:05

## 2023-05-12 RX ADMIN — Medication 600 MILLIGRAM(S): at 18:59

## 2023-05-12 RX ADMIN — Medication 30 MILLIGRAM(S): at 06:18

## 2023-05-12 RX ADMIN — Medication 600 MILLIGRAM(S): at 18:29

## 2023-05-12 RX ADMIN — Medication 600 MILLIGRAM(S): at 13:13

## 2023-05-12 RX ADMIN — Medication 975 MILLIGRAM(S): at 03:53

## 2023-05-12 RX ADMIN — Medication 30 MILLIGRAM(S): at 02:10

## 2023-05-12 RX ADMIN — Medication 600 MILLIGRAM(S): at 23:57

## 2023-05-12 RX ADMIN — OXYCODONE HYDROCHLORIDE 5 MILLIGRAM(S): 5 TABLET ORAL at 14:35

## 2023-05-12 RX ADMIN — HEPARIN SODIUM 5000 UNIT(S): 5000 INJECTION INTRAVENOUS; SUBCUTANEOUS at 14:03

## 2023-05-12 RX ADMIN — MAGNESIUM HYDROXIDE 30 MILLILITER(S): 400 TABLET, CHEWABLE ORAL at 05:49

## 2023-05-12 RX ADMIN — SIMETHICONE 80 MILLIGRAM(S): 80 TABLET, CHEWABLE ORAL at 05:49

## 2023-05-12 RX ADMIN — Medication 975 MILLIGRAM(S): at 20:27

## 2023-05-12 RX ADMIN — Medication 975 MILLIGRAM(S): at 21:00

## 2023-05-12 RX ADMIN — SIMETHICONE 80 MILLIGRAM(S): 80 TABLET, CHEWABLE ORAL at 14:03

## 2023-05-12 RX ADMIN — Medication 600 MILLIGRAM(S): at 12:43

## 2023-05-12 RX ADMIN — Medication 30 MILLIGRAM(S): at 05:49

## 2023-05-12 NOTE — PROGRESS NOTE ADULT - ATTENDING COMMENTS
Associate Chief of L & D ( late entry)       I have met this patient for the first time today.  Patient receives care at [facility] and was and admitted and delivered by Dr Salinas    OB Progress Note:  Delivery, POD#1    S: 31yo POD#1 s/p RLTCS . Patient denies any complaints at this time  O:   Vital Signs Last 24 Hrs  T(C): 36.8 (12 May 2023 11:06), Max: 36.8 (12 May 2023 06:00)  T(F): 98.3 (12 May 2023 11:06), Max: 98.3 (12 May 2023 11:06)  HR: 91 (12 May 2023 11:06) (72 - 95)  BP: 121/78 (12 May 2023 11:06) (86/59 - 125/61)  BP(mean): 73 (11 May 2023 16:30) (64 - 95)  RR: 18 (12 May 2023 06:00) (14 - 23)  SpO2: 100% (12 May 2023 11:06) (97% - 100%)    Parameters below as of 11 May 2023 22:00  Patient On (Oxygen Delivery Method): room air      Labs:  Blood type: O Positive  Rubella IgG: RPR: Negative                          9.4<L>   9.69 >-----------< 173    (  @ 07:35 )             28.6<L>                        12.0   8.70 >-----------< 220    (  @ 09:41 )             36.2    23 @ 09:41      136  |  104  |  5<L>  ----------------------------<  80  3.8   |  19<L>  |  0.50        Ca    8.7      11 May 2023 09:41    TPro  6.6  /  Alb  3.5  /  TBili  0.2  /  DBili  x   /  AST  12  /  ALT  6   /  AlkPhos  100  23 @ 09:41        PE:  General: NAD  Abdomen: Mildly distended, appropriately tender  incision c/d/i.  Lochia scant  Extremities: No erythema, tace edema    A/P: 31yo POD#1 s/p R-LTCS GDMA1 and h/oPEC with prior pregnancy    - Continue regular diet.  - Increase ambulation.  - Continue Motrin, Tylenol, oxycodone PRN for pain control.  vs. continue PCEA for pain.  - F/u AM CBC  - Monitor BP's    Modesta De Tejas Fonseca M.D., M.B.A., M.S.

## 2023-05-12 NOTE — PROGRESS NOTE ADULT - ASSESSMENT
A/P: 33yo  POD#1 s/p rLTCS,  cc, pregnancy c/b GDMA1.  Patient is stable and doing well post-operatively.     #GDMA1  - FS pre-op 90s-100s  - Will f/u for 6-week OGTT    #PP  - Pain well controlled, continue Motrin, Tylenol, and Oxycodone PRN  - Increase ambulation, SCDs when not ambulating  - Continue regular diet  - H/H 12.0/36.2 -> 9.4/28.6, patient is hemodynamically stable and asymptomatic  - Heparin 5000u BID for prophylaxis  - Hx sPEC in prior pregnancy, however currently normotensive with no si/sx sPEC    Manisha Tripathi PGY1

## 2023-05-13 VITALS
TEMPERATURE: 98 F | HEART RATE: 86 BPM | DIASTOLIC BLOOD PRESSURE: 80 MMHG | SYSTOLIC BLOOD PRESSURE: 130 MMHG | RESPIRATION RATE: 18 BRPM | OXYGEN SATURATION: 100 %

## 2023-05-13 RX ORDER — TETANUS TOXOID, REDUCED DIPHTHERIA TOXOID AND ACELLULAR PERTUSSIS VACCINE, ADSORBED 5; 2.5; 8; 8; 2.5 [IU]/.5ML; [IU]/.5ML; UG/.5ML; UG/.5ML; UG/.5ML
0.5 SUSPENSION INTRAMUSCULAR ONCE
Refills: 0 | Status: COMPLETED | OUTPATIENT
Start: 2023-05-13 | End: 2023-05-13

## 2023-05-13 RX ADMIN — TETANUS TOXOID, REDUCED DIPHTHERIA TOXOID AND ACELLULAR PERTUSSIS VACCINE, ADSORBED 0.5 MILLILITER(S): 5; 2.5; 8; 8; 2.5 SUSPENSION INTRAMUSCULAR at 06:35

## 2023-05-13 RX ADMIN — OXYCODONE HYDROCHLORIDE 5 MILLIGRAM(S): 5 TABLET ORAL at 06:26

## 2023-05-13 RX ADMIN — OXYCODONE HYDROCHLORIDE 5 MILLIGRAM(S): 5 TABLET ORAL at 12:36

## 2023-05-13 RX ADMIN — Medication 975 MILLIGRAM(S): at 03:30

## 2023-05-13 RX ADMIN — OXYCODONE HYDROCHLORIDE 5 MILLIGRAM(S): 5 TABLET ORAL at 05:50

## 2023-05-13 RX ADMIN — HEPARIN SODIUM 5000 UNIT(S): 5000 INJECTION INTRAVENOUS; SUBCUTANEOUS at 03:06

## 2023-05-13 RX ADMIN — OXYCODONE HYDROCHLORIDE 5 MILLIGRAM(S): 5 TABLET ORAL at 13:36

## 2023-05-13 RX ADMIN — Medication 600 MILLIGRAM(S): at 12:05

## 2023-05-13 RX ADMIN — Medication 600 MILLIGRAM(S): at 05:51

## 2023-05-13 RX ADMIN — Medication 600 MILLIGRAM(S): at 00:30

## 2023-05-13 RX ADMIN — Medication 600 MILLIGRAM(S): at 06:26

## 2023-05-13 RX ADMIN — Medication 600 MILLIGRAM(S): at 11:07

## 2023-05-13 RX ADMIN — Medication 975 MILLIGRAM(S): at 03:06

## 2023-05-13 NOTE — PROGRESS NOTE ADULT - SUBJECTIVE AND OBJECTIVE BOX
Day ___1 of Anesthesia Pain Management Service    SUBJECTIVE:  Pain Scale Score	At rest: __none_ 	With Activity: __mild_ 	[x ] Refer to charted pain scores    THERAPY:    s/p _____0.1__ mg PF morphine     OBJECTIVE:    Sedation Score:	[ x] Alert	[ ] Drowsy	[ ] Arousable	[ ] Asleep	[ ] Unresponsive    Side Effects:	[x ] None	[ ] Nausea	[ ] Vomiting	[ ] Pruritus  		  [ ] Weakness		[ ] Numbness	[ ] Other:    Vital Signs Last 24 Hrs  T(C): 36.8 (12 May 2023 06:00), Max: 36.9 (11 May 2023 07:52)  T(F): 98.2 (12 May 2023 06:00), Max: 98.42 (11 May 2023 07:52)  HR: 76 (12 May 2023 06:00) (72 - 95)  BP: 111/63 (12 May 2023 06:00) (86/59 - 127/92)  BP(mean): 73 (11 May 2023 16:30) (64 - 95)  RR: 18 (12 May 2023 06:00) (14 - 23)  SpO2: 98% (12 May 2023 06:00) (97% - 100%)    Parameters below as of 11 May 2023 22:00  Patient On (Oxygen Delivery Method): room air        ASSESSMENT/ PLAN  [x ] Patient transitioned to prn analgesics  [ x] Pain management per primary service, pain service to sign off   [ x]Documentation and Verification of current medications     Comments: No anesthetic complications.
OB Progress Note:  Delivery, POD#1    S: Patient feels well. Pain is well controlled. She is tolerating a regular diet. Not yet passing flatus. She is voiding spontaneously and ambulating without difficulty. Endorses light vaginal bleeding, soaking < 1 pad/hour. Denies CP/SOB, lightheadedness/dizziness, N/V. Denies headache, visual changes, RUQ pain, worsening edema.    MEDICATIONS  (STANDING):  acetaminophen     Tablet .. 975 milliGRAM(s) Oral <User Schedule>  diphtheria/tetanus/pertussis (acellular) Vaccine (Adacel) 0.5 milliLiter(s) IntraMuscular once  heparin   Injectable 5000 Unit(s) SubCutaneous every 12 hours  ibuprofen  Tablet. 600 milliGRAM(s) Oral every 6 hours  ketorolac   Injectable 30 milliGRAM(s) IV Push every 6 hours  lactated ringers Bolus 1000 milliLiter(s) IV Bolus once  lactated ringers. 1000 milliLiter(s) (125 mL/Hr) IV Continuous <Continuous>  lactated ringers. 1000 milliLiter(s) (75 mL/Hr) IV Continuous <Continuous>  oxytocin Infusion 333.333 milliUNIT(s)/Min (1000 mL/Hr) IV Continuous <Continuous>      MEDICATIONS  (PRN):  diphenhydrAMINE 25 milliGRAM(s) Oral every 6 hours PRN Pruritus  lanolin Ointment 1 Application(s) Topical every 6 hours PRN Sore Nipples  magnesium hydroxide Suspension 30 milliLiter(s) Oral two times a day PRN Constipation  oxyCODONE    IR 5 milliGRAM(s) Oral every 3 hours PRN Moderate to Severe Pain (4-10)  oxyCODONE    IR 5 milliGRAM(s) Oral once PRN Moderate to Severe Pain (4-10)  simethicone 80 milliGRAM(s) Chew every 4 hours PRN Gas      O:  Vitals:  Vital Signs Last 24 Hrs  T(C): 36.8 (12 May 2023 06:00), Max: 36.9 (11 May 2023 10:16)  T(F): 98.2 (12 May 2023 06:00), Max: 98.4 (11 May 2023 10:16)  HR: 76 (12 May 2023 06:00) (72 - 95)  BP: 111/63 (12 May 2023 06:00) (86/59 - 125/61)  BP(mean): 73 (11 May 2023 16:30) (64 - 95)  RR: 18 (12 May 2023 06:00) (14 - 23)  SpO2: 98% (12 May 2023 06:00) (97% - 100%)    Parameters below as of 11 May 2023 22:00  Patient On (Oxygen Delivery Method): room air        Labs:  Blood type: O Positive  Rubella IgG: RPR: Negative                          9.4<L>   9.69 >-----------< 173    (  @ 07:35 )             28.6<L>                        12.0   8.70 >-----------< 220    (  @ 09:41 )             36.2    23 @ 09:41      136  |  104  |  5<L>  ----------------------------<  80  3.8   |  19<L>  |  0.50        Ca    8.7      11 May 2023 09:41    TPro  6.6  /  Alb  3.5  /  TBili  0.2  /  DBili  x   /  AST  12  /  ALT  6   /  AlkPhos  100  23 @ 09:41          Physical Exam:  General: NAD  Heart: Clinically well-perfused  Lungs: Breathing comfortably on room air  Abdomen: Soft, non-distended, appropriately tender, fundus firm; low transverse incision c/d/i w/ dermabond prineo in place  Extremities: No calf edema/tenderness
Postop Day  __1_ s/p   C- Section    THERAPY:  [ x ] Spinal morphine   (  ) mg  on (        )  [  ] Epidural morphine   [  ] IV PCA Hydromorphone 1 mg/ml      Sedation Score:	  [ x ] Alert	    [  ] Drowsy        [  ] Arousable	[  ] Asleep	[  ] Unresponsive    Side Effects:	  [ x ] None	     [  ] Nausea        [  ] Pruritus        [  ] Weakness   [  ] Numbness        ASSESSMENT/ PLAN   [   ] Discontinue         [  ] Continue  [ x ]Documentation and Verification of current medications     Comments: Transitioned to prn oral analgesics by primary team. No anesthetic complication.
R1 Progress Note    Patient seen and examined at bedside, no acute overnight events. No acute complaints, pain well controlled. Patient is ambulating and tolerating regular diet. Voiding spontaneously and passing flatus. Denies CP, SOB, N/V, HA, blurred vision, epigastric pain.    Vital Signs Last 24 Hours  T(C): 36.6 (05-13-23 @ 06:09), Max: 36.8 (05-12-23 @ 11:06)  HR: 68 (05-13-23 @ 06:09) (68 - 91)  BP: 109/72 (05-13-23 @ 06:09) (109/72 - 124/77)  RR: 18 (05-13-23 @ 06:09) (18 - 18)  SpO2: 99% (05-13-23 @ 06:09) (99% - 100%)    I&O's Summary      Physical Exam:  General: NAD  Abdomen: Soft, non-tender, non-distended, fundus firm  Incision: Pfannenstiel incision CDI, subcuticular suture closure  Pelvic: Lochia wnl    Labs:    Blood Type: O Positive  Antibody Screen: --  RPR: Negative               9.4    9.69  )-----------( 173      ( 05-12 @ 07:35 )             28.6                12.0   8.70  )-----------( 220      ( 05-11 @ 09:41 )             36.2         MEDICATIONS  (STANDING):  acetaminophen     Tablet .. 975 milliGRAM(s) Oral <User Schedule>  heparin   Injectable 5000 Unit(s) SubCutaneous every 12 hours  ibuprofen  Tablet. 600 milliGRAM(s) Oral every 6 hours  lactated ringers Bolus 1000 milliLiter(s) IV Bolus once  lactated ringers. 1000 milliLiter(s) (125 mL/Hr) IV Continuous <Continuous>  lactated ringers. 1000 milliLiter(s) (75 mL/Hr) IV Continuous <Continuous>  oxytocin Infusion 333.333 milliUNIT(s)/Min (1000 mL/Hr) IV Continuous <Continuous>    MEDICATIONS  (PRN):  diphenhydrAMINE 25 milliGRAM(s) Oral every 6 hours PRN Pruritus  lanolin Ointment 1 Application(s) Topical every 6 hours PRN Sore Nipples  magnesium hydroxide Suspension 30 milliLiter(s) Oral two times a day PRN Constipation  oxyCODONE    IR 5 milliGRAM(s) Oral every 3 hours PRN Moderate to Severe Pain (4-10)  oxyCODONE    IR 5 milliGRAM(s) Oral once PRN Moderate to Severe Pain (4-10)  simethicone 80 milliGRAM(s) Chew every 4 hours PRN Gas

## 2023-05-13 NOTE — PROGRESS NOTE ADULT - ATTENDING COMMENTS
Patient evaluated at bedside, says she is feeling well and has no complaints. She is meeting all postpartum milestones. Vitals reviewed: /72, HR 68. NAD, abdomen soft, non-tender non-distended, fundus firm below umbilicus, no calf tenderness b/l.  A/P: 33yo P2 now POD2 s/p rLTCS c/b GDMA1. Patient is stable for d/c home. Routine postpartum care, for 6 wk postpartum IUD with her ObGyn for contraception. Patient will need a glucose tolerance test in 4-12wks.    Lucrecia FOX  Ob Service Attending

## 2023-05-13 NOTE — PROGRESS NOTE ADULT - ASSESSMENT
A/P: 31yo  POD#2 s/p rLTCS,  cc, pregnancy c/b GDMA1. H/H 9.4/28.6. Patient is stable and doing well post-operatively.     #GDMA1  - FS pre-op 90s-100s  - Will f/u for 6-week OGTT    #PP  - Pain well controlled, continue Motrin, Tylenol, and Oxycodone PRN  - Increase ambulation, SCDs when not ambulating  - Continue regular diet  - H/H 12.0/36.2 -> 9.4/28.6, patient is hemodynamically stable and asymptomatic  - Heparin 5000u BID for prophylaxis  - Hx sPEC in prior pregnancy, however currently normotensive with no si/sx sPEC  - Pt declining pp contraception at this time    Linsey Loyd PGY1

## 2023-05-13 NOTE — PROVIDER CONTACT NOTE (OTHER) - RECOMMENDATIONS
Continue to monitor and assess patient for pain every 3 hours. Educate patient to notify nursing when in pain.

## 2024-03-11 NOTE — OB RN INTRAOPERATIVE NOTE - NS_DELIVERYASSIST1_OBGYN_ALL_OB_FT
OFFICE NOTE ESTABLISHED PATIENT  No chief complaint on file.    HPI:  Hannah Arreguin is a 37 year old female presenting today for a routine follow up for hypertension. She last saw me in office about one year ago. Her irbesartan was raised to 75 mg daily. Since this visit she has had no cardiac related hospital stays or testing.    She presents today in office accompanied by ***. She is feeling ***.     Echo:     Stress test:    Cath:    Other:    {ABM CARDIAC RISK FACTORS:631678}    PMHx:  Patient  has a past medical history of Hepatitis B immune (2001).    She has no past medical history of Fibroid.    SOCIAL:  Patient  reports that she has never smoked. She has never used smokeless tobacco. She reports current alcohol use. She reports that she does not use drugs.    Allergies:  ALLERGIES:  No Known Allergies     Medications:  Current Outpatient Medications   Medication Sig    Slynd 4 MG Tab daily.    irbesartan (AVAPRO) 75 MG tablet Take 1 tablet by mouth nightly.     No current facility-administered medications for this visit.         Vital signs:  Visit Vitals  LMP 09/26/2023 (Approximate)       There is no height or weight on file to calculate BMI.    Physical exam:  Constitutional:  Well nourished, no distress;   HENT:  No JVD. Carotid upstroke normal; no bruits heard.   Respiratory:  Normal effort, good air entry; no rales or wheezing heard.  Cardiac:  S1, S2 regular, no S3 or S4; *** murmur ***.  Vascular:  Radial and DP 2+ b/l.  Abdomen:  No hepatojugular reflux.  Skin:  *** edema.  Neurologic:  Alert and oriented x3.    Labs:   Lab Results   Component Value Date    SODIUM 140 09/16/2022    POTASSIUM 4.1 09/16/2022    BUN 15 09/16/2022    CREATININE 1.09 (H) 09/16/2022    WBC 7.2 08/08/2017    HCT 41.5 08/08/2017    HGB 14.2 08/08/2017     08/08/2017    GLUCOSE 87 09/16/2022    TSH 0.805 09/16/2022    CHOLESTEROL 274 (H) 10/24/2014    HDL 72 10/24/2014    CALCLDL 151 (H) 10/24/2014    TRIGLYCERIDE  255 (H) 10/24/2014         ASSESSMENT:   No diagnosis found.    RECOMMENDATIONS:   1. ***  2. ***    ORDERS:  No orders of the defined types were placed in this encounter.      No follow-ups on file.      On 03/11/24, I, Ken Schaefer MA, scribed the services personally performed by Dr. Romain Gresham MD      I have reviewed the patient's history and personally performed the history of present illness, physical examination, clinical impressions, and plan; and also reviewed, edited, and verified the scribed note.    Romain Gresham MD  CARDIOLOGY        maximo meza

## 2024-11-21 NOTE — OB PROVIDER LABOR PROGRESS NOTE - NS_OBIHICONTRACTIONPATTERNDETAILS_OBGYN_ALL_OB_FT
I let the pt's mom know that her portion form has not yet been filled out, and she will need to fill that out before giving the form to the school. Mom verbalizes understanding. She will  the form.   
q4 min
q3
q4-5
[place]: q 4-5 min
q3min

## 2025-05-20 NOTE — OB RN DELIVERY SUMMARY - NS_DELIVERYANESTHES_OBGYN_ALL_OB_FT
[FreeTextEntry1] : PFTs revealed normal flows; FEV1 was 3.79 L, which is 127% of predicted; normal flow volume loop.  PFTs were performed to evaluate for    FENO was 10; a normal value being less than 25 Fractional exhaled nitric oxide (FENO) is regarded as a simple, noninvasive method for assessing eosinophilic airway inflammation. Produced by a variety of cells within the lung, nitric oxide (NO) concentrations are generally low in healthy individuals. However, high concentrations of NO appear to be involved in nonspecific host defense mechanisms and chronic inflammatory diseases such as asthma. The American Thoracic Society (ATS) therefore has recommended using FENO to aid in the diagnosis and monitoring of eosinophilic airway inflammation and asthma, and for identifying steroid responsive individuals whose chronic respiratory symptoms may be caused by airway inflammation.   The American Thoracic Society (ATS) strongly recommends the use of FeNO measurement to aid in the assessment, management, and long-term monitoring of asthma. In their 2011 clinical practice guideline, the ATS emphasizes the importance of using FeNO. martha ryan